# Patient Record
Sex: MALE | Race: OTHER | Employment: UNEMPLOYED | URBAN - METROPOLITAN AREA
[De-identification: names, ages, dates, MRNs, and addresses within clinical notes are randomized per-mention and may not be internally consistent; named-entity substitution may affect disease eponyms.]

---

## 2021-05-15 ENCOUNTER — HOSPITAL ENCOUNTER (EMERGENCY)
Age: 26
Discharge: OTHER INSTITUTION | DRG: 750 | End: 2021-05-15
Attending: EMERGENCY MEDICINE
Payer: COMMERCIAL

## 2021-05-15 ENCOUNTER — APPOINTMENT (OUTPATIENT)
Dept: CT IMAGING | Age: 26
DRG: 750 | End: 2021-05-15
Attending: EMERGENCY MEDICINE
Payer: COMMERCIAL

## 2021-05-15 VITALS
WEIGHT: 180 LBS | RESPIRATION RATE: 18 BRPM | BODY MASS INDEX: 25.77 KG/M2 | SYSTOLIC BLOOD PRESSURE: 150 MMHG | HEART RATE: 77 BPM | DIASTOLIC BLOOD PRESSURE: 94 MMHG | TEMPERATURE: 98.7 F | OXYGEN SATURATION: 97 % | HEIGHT: 70 IN

## 2021-05-15 LAB
ALBUMIN SERPL-MCNC: 4.1 G/DL (ref 3.5–5)
ALBUMIN/GLOB SERPL: 0.9 {RATIO} (ref 1.1–2.2)
ALP SERPL-CCNC: 115 U/L (ref 45–117)
ALT SERPL-CCNC: 100 U/L (ref 12–78)
AMPHET UR QL SCN: NEGATIVE
ANION GAP SERPL CALC-SCNC: 14 MMOL/L (ref 5–15)
APPEARANCE UR: CLEAR
AST SERPL W P-5'-P-CCNC: 99 U/L (ref 15–37)
BACTERIA URNS QL MICRO: NEGATIVE /HPF
BARBITURATES UR QL SCN: NEGATIVE
BASOPHILS # BLD: 0 K/UL (ref 0–0.1)
BASOPHILS NFR BLD: 0 % (ref 0–1)
BENZODIAZ UR QL: NEGATIVE
BILIRUB SERPL-MCNC: 0.4 MG/DL (ref 0.2–1)
BILIRUB UR QL: NEGATIVE
BUN SERPL-MCNC: 11 MG/DL (ref 6–20)
BUN/CREAT SERPL: 10 (ref 12–20)
CA-I BLD-MCNC: 9.2 MG/DL (ref 8.5–10.1)
CANNABINOIDS UR QL SCN: NEGATIVE
CHLORIDE SERPL-SCNC: 99 MMOL/L (ref 97–108)
CO2 SERPL-SCNC: 19 MMOL/L (ref 21–32)
COCAINE UR QL SCN: NEGATIVE
COLOR UR: ABNORMAL
CREAT SERPL-MCNC: 1.09 MG/DL (ref 0.7–1.3)
DIFFERENTIAL METHOD BLD: ABNORMAL
DRUG SCRN COMMENT,DRGCM: NORMAL
EOSINOPHIL # BLD: 0 K/UL (ref 0–0.4)
EOSINOPHIL NFR BLD: 0 % (ref 0–7)
ERYTHROCYTE [DISTWIDTH] IN BLOOD BY AUTOMATED COUNT: 14.2 % (ref 11.5–14.5)
ETHANOL SERPL-MCNC: <4 MG/DL
GLOBULIN SER CALC-MCNC: 4.4 G/DL (ref 2–4)
GLUCOSE SERPL-MCNC: 189 MG/DL (ref 65–100)
GLUCOSE UR STRIP.AUTO-MCNC: NEGATIVE MG/DL
HCT VFR BLD AUTO: 43.3 % (ref 36.6–50.3)
HGB BLD-MCNC: 14.1 G/DL (ref 12.1–17)
HGB UR QL STRIP: NEGATIVE
IMM GRANULOCYTES # BLD AUTO: 0.1 K/UL (ref 0–0.04)
IMM GRANULOCYTES NFR BLD AUTO: 1 % (ref 0–0.5)
KETONES UR QL STRIP.AUTO: 80 MG/DL
LEUKOCYTE ESTERASE UR QL STRIP.AUTO: NEGATIVE
LYMPHOCYTES # BLD: 1.1 K/UL (ref 0.8–3.5)
LYMPHOCYTES NFR BLD: 8 % (ref 12–49)
MCH RBC QN AUTO: 27.6 PG (ref 26–34)
MCHC RBC AUTO-ENTMCNC: 32.6 G/DL (ref 30–36.5)
MCV RBC AUTO: 84.9 FL (ref 80–99)
METHADONE UR QL: NEGATIVE
MONOCYTES # BLD: 0.6 K/UL (ref 0–1)
MONOCYTES NFR BLD: 4 % (ref 5–13)
MUCOUS THREADS URNS QL MICRO: ABNORMAL /LPF
NEUTS SEG # BLD: 12 K/UL (ref 1.8–8)
NEUTS SEG NFR BLD: 87 % (ref 32–75)
NITRITE UR QL STRIP.AUTO: NEGATIVE
NRBC # BLD: 0 K/UL (ref 0–0.01)
NRBC BLD-RTO: 0 PER 100 WBC
OPIATES UR QL: NEGATIVE
PCP UR QL: NEGATIVE
PH UR STRIP: 6 [PH] (ref 5–8)
PLATELET # BLD AUTO: 358 K/UL (ref 150–400)
PMV BLD AUTO: 9.3 FL (ref 8.9–12.9)
POTASSIUM SERPL-SCNC: 3.2 MMOL/L (ref 3.5–5.1)
PROT SERPL-MCNC: 8.5 G/DL (ref 6.4–8.2)
PROT UR STRIP-MCNC: 100 MG/DL
RBC # BLD AUTO: 5.1 M/UL (ref 4.1–5.7)
RBC #/AREA URNS HPF: ABNORMAL /HPF (ref 0–5)
SARS-COV-2, COV2: NORMAL
SARS-COV-2, COV2: NOT DETECTED
SODIUM SERPL-SCNC: 132 MMOL/L (ref 136–145)
SP GR UR REFRACTOMETRY: 1.02 (ref 1–1.03)
UA: UC IF INDICATED,UAUC: ABNORMAL
UROBILINOGEN UR QL STRIP.AUTO: 0.1 EU/DL (ref 0.1–1)
WBC # BLD AUTO: 13.9 K/UL (ref 4.1–11.1)
WBC URNS QL MICRO: ABNORMAL /HPF (ref 0–4)

## 2021-05-15 PROCEDURE — 85025 COMPLETE CBC W/AUTO DIFF WBC: CPT

## 2021-05-15 PROCEDURE — 93005 ELECTROCARDIOGRAM TRACING: CPT

## 2021-05-15 PROCEDURE — 90715 TDAP VACCINE 7 YRS/> IM: CPT | Performed by: EMERGENCY MEDICINE

## 2021-05-15 PROCEDURE — 70450 CT HEAD/BRAIN W/O DYE: CPT

## 2021-05-15 PROCEDURE — 90471 IMMUNIZATION ADMIN: CPT

## 2021-05-15 PROCEDURE — 99284 EMERGENCY DEPT VISIT MOD MDM: CPT

## 2021-05-15 PROCEDURE — 81001 URINALYSIS AUTO W/SCOPE: CPT

## 2021-05-15 PROCEDURE — 74011250636 HC RX REV CODE- 250/636: Performed by: EMERGENCY MEDICINE

## 2021-05-15 PROCEDURE — 82077 ASSAY SPEC XCP UR&BREATH IA: CPT

## 2021-05-15 PROCEDURE — 36415 COLL VENOUS BLD VENIPUNCTURE: CPT

## 2021-05-15 PROCEDURE — 87635 SARS-COV-2 COVID-19 AMP PRB: CPT

## 2021-05-15 PROCEDURE — 80053 COMPREHEN METABOLIC PANEL: CPT

## 2021-05-15 PROCEDURE — 80307 DRUG TEST PRSMV CHEM ANLYZR: CPT

## 2021-05-15 PROCEDURE — 96360 HYDRATION IV INFUSION INIT: CPT

## 2021-05-15 RX ADMIN — TETANUS TOXOID, REDUCED DIPHTHERIA TOXOID AND ACELLULAR PERTUSSIS VACCINE, ADSORBED 0.5 ML: 5; 2.5; 8; 8; 2.5 SUSPENSION INTRAMUSCULAR at 11:08

## 2021-05-15 RX ADMIN — SODIUM CHLORIDE 1000 ML: 9 INJECTION, SOLUTION INTRAVENOUS at 11:08

## 2021-05-15 NOTE — ED NOTES
Pt sitting up on stretcher. resps are even and unlabored. Skin warm and dry. No distress noted. Pt was asked what happened, and he stated \"[facility]. And I was sitting in a chair, and had a heart attack, and they left me there. And that's when the maggots and the snakes crawled into my body\"    Pt asked if he has mental health history and reports schizophrenia. Message left with [facility] for evaluation.

## 2021-05-15 NOTE — ED PROVIDER NOTES
EMERGENCY DEPARTMENT HISTORY AND PHYSICAL EXAM        Date: 5/15/2021  Patient Name: Jordi Waggoner    History of Presenting Illness     Chief Complaint   Patient presents with    Altered mental status    Drug Overdose       History Provided By: Patient    HPI: Jordi Waggoner, 26 y.o. male with history of psychiatric disorder who presents with episode of altered mental status and abrasions. Patient mitts to marijuana use. He is otherwise a poor historian due to psychiatric disorder. According to EMS patient was seen at group home to be having seizure-like activity. PCP: Yvonne Montiel NP        Past History     Past Medical History:  Psychiatric disorder    Past Surgical History:  Reviewed and noncontributory    Family History:  Reviewed and noncontributory    Social History:  Social History     Tobacco Use    Smoking status: Not on file   Substance Use Topics    Alcohol use: Not on file    Drug use: Not on file       Allergies: Allergies   Allergen Reactions    Haldol [Haloperidol Lactate] Anaphylaxis           Review of Systems   Review of Systems   Constitutional: Negative for fever. HENT: Negative for congestion. Eyes: Negative for visual disturbance. Respiratory: Negative for shortness of breath. Cardiovascular: Negative for chest pain. Gastrointestinal: Negative for abdominal pain. Genitourinary: Negative for dysuria. Musculoskeletal: Negative for arthralgias. Skin: Negative for rash. Neurological: Negative for headaches. Physical Exam   Constitutional: No acute distress. Well-nourished. Skin: No rash. Abrasions to forearm that are linear. ENT: No rhinorrhea. No cough. Head is normocephalic and atraumatic. Eye: No proptosis or conjunctival injections. Respiratory: No apparent respiratory distress. Gastrointestinal: Nondistended. Musculoskeletal: No obvious bony deformities. Psychiatric: Cooperative. Appropriate mood and affect.     Diagnostic Study Results Labs -     Recent Results (from the past 24 hour(s))   CBC WITH AUTOMATED DIFF    Collection Time: 05/15/21  9:14 AM   Result Value Ref Range    WBC 13.9 (H) 4.1 - 11.1 K/uL    RBC 5.10 4. 10 - 5.70 M/uL    HGB 14.1 12.1 - 17.0 g/dL    HCT 43.3 36.6 - 50.3 %    MCV 84.9 80.0 - 99.0 FL    MCH 27.6 26.0 - 34.0 PG    MCHC 32.6 30.0 - 36.5 g/dL    RDW 14.2 11.5 - 14.5 %    PLATELET 097 853 - 253 K/uL    MPV 9.3 8.9 - 12.9 FL    NRBC 0.0 0.0  WBC    ABSOLUTE NRBC 0.00 0.00 - 0.01 K/uL    NEUTROPHILS 87 (H) 32 - 75 %    LYMPHOCYTES 8 (L) 12 - 49 %    MONOCYTES 4 (L) 5 - 13 %    EOSINOPHILS 0 0 - 7 %    BASOPHILS 0 0 - 1 %    IMMATURE GRANULOCYTES 1 (H) 0 - 0.5 %    ABS. NEUTROPHILS 12.0 (H) 1.8 - 8.0 K/UL    ABS. LYMPHOCYTES 1.1 0.8 - 3.5 K/UL    ABS. MONOCYTES 0.6 0.0 - 1.0 K/UL    ABS. EOSINOPHILS 0.0 0.0 - 0.4 K/UL    ABS. BASOPHILS 0.0 0.0 - 0.1 K/UL    ABS. IMM. GRANS. 0.1 (H) 0.00 - 0.04 K/UL    DF AUTOMATED     METABOLIC PANEL, COMPREHENSIVE    Collection Time: 05/15/21  9:14 AM   Result Value Ref Range    Sodium 132 (L) 136 - 145 mmol/L    Potassium 3.2 (L) 3.5 - 5.1 mmol/L    Chloride 99 97 - 108 mmol/L    CO2 19 (L) 21 - 32 mmol/L    Anion gap 14 5 - 15 mmol/L    Glucose 189 (H) 65 - 100 mg/dL    BUN 11 6 - 20 mg/dL    Creatinine 1.09 0.70 - 1.30 mg/dL    BUN/Creatinine ratio 10 (L) 12 - 20      GFR est AA >60 >60 ml/min/1.73m2    GFR est non-AA >60 >60 ml/min/1.73m2    Calcium 9.2 8.5 - 10.1 mg/dL    Bilirubin, total 0.4 0.2 - 1.0 mg/dL    AST (SGOT) 99 (H) 15 - 37 U/L    ALT (SGPT) 100 (H) 12 - 78 U/L    Alk.  phosphatase 115 45 - 117 U/L    Protein, total 8.5 (H) 6.4 - 8.2 g/dL    Albumin 4.1 3.5 - 5.0 g/dL    Globulin 4.4 (H) 2.0 - 4.0 g/dL    A-G Ratio 0.9 (L) 1.1 - 2.2     ETHYL ALCOHOL    Collection Time: 05/15/21  9:14 AM   Result Value Ref Range    ALCOHOL(ETHYL),SERUM <4 <10 mg/dL   URINALYSIS W/ REFLEX CULTURE    Collection Time: 05/15/21  9:15 AM    Specimen: Urine   Result Value Ref Range Color Yellow/Straw      Appearance Clear Clear      Specific gravity 1.023 1.003 - 1.030      pH (UA) 6.0 5.0 - 8.0      Protein 100 (A) Negative mg/dL    Glucose Negative Negative mg/dL    Ketone 80 (A) Negative mg/dL    Bilirubin Negative Negative      Blood Negative Negative      Urobilinogen 0.1 0.1 - 1.0 EU/dL    Nitrites Negative Negative      Leukocyte Esterase Negative Negative      UA:UC IF INDICATED Culture not indicated by UA result Culture not indicated by UA result      WBC 0-4 0 - 4 /hpf    RBC 0-5 0 - 5 /hpf    Bacteria Negative Negative /hpf    Mucus Trace /lpf   DRUG SCREEN, URINE    Collection Time: 05/15/21  9:15 AM   Result Value Ref Range    AMPHETAMINES Negative Negative      BARBITURATES Negative Negative      BENZODIAZEPINES Negative Negative      COCAINE Negative Negative      METHADONE Negative Negative      OPIATES Negative Negative      PCP(PHENCYCLIDINE) Negative Negative      THC (TH-CANNABINOL) Negative Negative      Drug screen comment        This test is a screen for drugs of abuse in a medical setting only (i.e., they are unconfirmed results and as such must not be used for non-medical purposes, e.g.,employment testing, legal testing). Due to its inherent nature, false positive (FP) and false negative (FN) results may be obtained. Therefore, if necessary for medical care, recommend confirmation of positive findings by GC/MS. SARS-COV-2    Collection Time: 05/15/21  2:20 PM   Result Value Ref Range    SARS-CoV-2 Please find results under separate order         Radiologic Studies -   CT HEAD WO CONT   Final Result   No significant finding. If additional imaging is needed, consider   MRI. CT Results  (Last 48 hours)               05/15/21 0952  CT HEAD WO CONT Final result    Impression:  No significant finding. If additional imaging is needed, consider   MRI.        Narrative:  Dose Reduction:    All CT scans at this facility are performed using dose reduction optimization techniques as appropriate to a performed exam including the following: Automated   exposure control, adjustments of the mA and/or kV according to patient size, or   use of iterative reconstruction technique. Findings: Unenhanced images were obtained. Normal appearance of ventricles. Normal gray-white differentiation. No focal abnormalities seen involving brain   parenchyma. No evidence of brain mass, hemorrhage, or acute large vessel   distribution infarct. No abnormality is seen involving the corpus callosum,   craniovertebral junction, or pituitary gland. Imaged mastoids and paranasal   sinuses appear clear except for minimal anterior superior right maxillary   mucosal thickening. CXR Results  (Last 48 hours)    None          Medical Decision Making and ED Course     I reviewed the available vital signs, nursing notes, past medical history, past surgical history, family history, and social history. Vital Signs - Reviewed the patient's vital signs. Patient Vitals for the past 12 hrs:   Temp Pulse Resp BP SpO2   05/15/21 1110  (!) 110 22 (!) 159/90 100 %   05/15/21 0909 98.9 °F (37.2 °C) (!) 132 22 138/68 93 %         Medical Decision Making:   Presented with abnormal behavior and thoughts. The differential diagnosis is seizure, syncope, delusions, psychosis. Work-up is negative. I feel he is medically cleared. Will be seen by behavioral health. Likely needs psychiatric admission. Disposition     Care transitioned to Dr. Elvis Means. Will likely need psychiatric admission. Diagnosis     Clinical impression:   1. Self-inflicted injury           Attestation:  Please note that this dictation was completed with [employer], the computer voice recognition software. Quite often unanticipated grammatical, syntax, homophones, and other interpretive errors are inadvertently transcribed by the computer software. Please disregard these errors.  Please excuse any errors that have escaped final proofreading. Thank you.   Hilario De La Rosa, DO

## 2021-05-15 NOTE — ED NOTES
[address] [place] [address] home to inform pt being d/c'd. Report given via phone to Jessee Guerra [phone], Patient Care Coordinator at [facility] for pt to return. [place] sending ride to  pt w/ provided ETA 18:30. Pt updated on status & plan of care.
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
## 2021-05-16 LAB
ATRIAL RATE: 132 BPM
CALCULATED R AXIS, ECG10: 53 DEGREES
CALCULATED T AXIS, ECG11: 31 DEGREES
DIAGNOSIS, 93000: NORMAL
P-R INTERVAL, ECG05: 104 MS
Q-T INTERVAL, ECG07: 376 MS
QRS DURATION, ECG06: 92 MS
QTC CALCULATION (BEZET), ECG08: 557 MS
VENTRICULAR RATE, ECG03: 132 BPM

## 2021-05-17 ENCOUNTER — APPOINTMENT (OUTPATIENT)
Dept: CT IMAGING | Age: 26
DRG: 750 | End: 2021-05-17
Attending: NURSE PRACTITIONER
Payer: COMMERCIAL

## 2021-05-17 ENCOUNTER — APPOINTMENT (OUTPATIENT)
Dept: GENERAL RADIOLOGY | Age: 26
DRG: 750 | End: 2021-05-17
Attending: NURSE PRACTITIONER
Payer: COMMERCIAL

## 2021-05-17 ENCOUNTER — HOSPITAL ENCOUNTER (INPATIENT)
Age: 26
LOS: 22 days | Discharge: HOME | DRG: 750 | End: 2021-06-08
Attending: PSYCHIATRY & NEUROLOGY | Admitting: PSYCHIATRY & NEUROLOGY
Payer: COMMERCIAL

## 2021-05-17 DIAGNOSIS — N17.9 ACUTE KIDNEY INJURY (HCC): Primary | ICD-10-CM

## 2021-05-17 DIAGNOSIS — R45.851 SUICIDAL IDEATIONS: ICD-10-CM

## 2021-05-17 DIAGNOSIS — R44.0 AUDITORY HALLUCINATIONS: ICD-10-CM

## 2021-05-17 DIAGNOSIS — R41.0 DISORIENTATION: ICD-10-CM

## 2021-05-17 PROBLEM — F20.9 SCHIZOPHRENIA (HCC): Status: ACTIVE | Noted: 2021-05-17

## 2021-05-17 LAB
AMMONIA PLAS-SCNC: 18 UMOL/L
AMPHET UR QL SCN: NEGATIVE
ANION GAP SERPL CALC-SCNC: 11 MMOL/L (ref 5–15)
APAP SERPL-MCNC: <10 UG/ML (ref 10–30)
APPEARANCE UR: CLEAR
BACTERIA URNS QL MICRO: NEGATIVE /HPF
BARBITURATES UR QL SCN: NEGATIVE
BASOPHILS # BLD: 0 K/UL (ref 0–0.1)
BASOPHILS NFR BLD: 0 % (ref 0–1)
BENZODIAZ UR QL: NEGATIVE
BILIRUB UR QL: NEGATIVE
BUN SERPL-MCNC: 19 MG/DL (ref 6–20)
BUN/CREAT SERPL: 9 (ref 12–20)
CA-I BLD-MCNC: 8.4 MG/DL (ref 8.5–10.1)
CANNABINOIDS UR QL SCN: NEGATIVE
CHLORIDE SERPL-SCNC: 104 MMOL/L (ref 97–108)
CO2 SERPL-SCNC: 20 MMOL/L (ref 21–32)
COCAINE UR QL SCN: NEGATIVE
COLOR UR: NORMAL
CREAT SERPL-MCNC: 2.13 MG/DL (ref 0.7–1.3)
DATE LAST DOSE: ABNORMAL
DATE LAST DOSE: ABNORMAL
DIFFERENTIAL METHOD BLD: ABNORMAL
DRUG SCRN COMMENT,DRGCM: NORMAL
EOSINOPHIL # BLD: 0 K/UL (ref 0–0.4)
EOSINOPHIL NFR BLD: 0 % (ref 0–7)
ERYTHROCYTE [DISTWIDTH] IN BLOOD BY AUTOMATED COUNT: 13.9 % (ref 11.5–14.5)
ETHANOL SERPL-MCNC: <4 MG/DL
GLUCOSE SERPL-MCNC: 156 MG/DL (ref 65–100)
GLUCOSE UR STRIP.AUTO-MCNC: NEGATIVE MG/DL
HCT VFR BLD AUTO: 40.7 % (ref 36.6–50.3)
HGB BLD-MCNC: 13.6 G/DL (ref 12.1–17)
HGB UR QL STRIP: NEGATIVE
IMM GRANULOCYTES # BLD AUTO: 0.1 K/UL (ref 0–0.04)
IMM GRANULOCYTES NFR BLD AUTO: 1 % (ref 0–0.5)
KETONES UR QL STRIP.AUTO: NEGATIVE MG/DL
LACTATE SERPL-SCNC: 1.6 MMOL/L (ref 0.4–2)
LEUKOCYTE ESTERASE UR QL STRIP.AUTO: NEGATIVE
LYMPHOCYTES # BLD: 0.7 K/UL (ref 0.8–3.5)
LYMPHOCYTES NFR BLD: 6 % (ref 12–49)
MAGNESIUM SERPL-MCNC: 2.4 MG/DL (ref 1.6–2.4)
MCH RBC QN AUTO: 28 PG (ref 26–34)
MCHC RBC AUTO-ENTMCNC: 33.4 G/DL (ref 30–36.5)
MCV RBC AUTO: 83.9 FL (ref 80–99)
METHADONE UR QL: NEGATIVE
MONOCYTES # BLD: 0.8 K/UL (ref 0–1)
MONOCYTES NFR BLD: 6 % (ref 5–13)
MUCOUS THREADS URNS QL MICRO: NORMAL /LPF
NEUTS SEG # BLD: 10.9 K/UL (ref 1.8–8)
NEUTS SEG NFR BLD: 87 % (ref 32–75)
NITRITE UR QL STRIP.AUTO: NEGATIVE
NRBC # BLD: 0 K/UL (ref 0–0.01)
NRBC BLD-RTO: 0 PER 100 WBC
OPIATES UR QL: NEGATIVE
PCP UR QL: NEGATIVE
PH UR STRIP: 6 [PH] (ref 5–8)
PLATELET # BLD AUTO: 303 K/UL (ref 150–400)
PMV BLD AUTO: 9.6 FL (ref 8.9–12.9)
POTASSIUM SERPL-SCNC: 3.1 MMOL/L (ref 3.5–5.1)
PROT UR STRIP-MCNC: NEGATIVE MG/DL
RBC # BLD AUTO: 4.85 M/UL (ref 4.1–5.7)
RBC #/AREA URNS HPF: NORMAL /HPF (ref 0–5)
REPORTED DOSE,DOSE: ABNORMAL UNITS
REPORTED DOSE,DOSE: ABNORMAL UNITS
SALICYLATES SERPL-MCNC: <1.7 MG/DL (ref 2.8–20)
SARS-COV-2, COV2: NORMAL
SARS-COV-2, COV2: NOT DETECTED
SODIUM SERPL-SCNC: 135 MMOL/L (ref 136–145)
SP GR UR REFRACTOMETRY: <1.005 (ref 1–1.03)
TROPONIN I SERPL-MCNC: <0.05 NG/ML
UA: UC IF INDICATED,UAUC: NORMAL
UROBILINOGEN UR QL STRIP.AUTO: 0.1 EU/DL (ref 0.1–1)
WBC # BLD AUTO: 12.5 K/UL (ref 4.1–11.1)
WBC URNS QL MICRO: NORMAL /HPF (ref 0–4)

## 2021-05-17 PROCEDURE — 81001 URINALYSIS AUTO W/SCOPE: CPT

## 2021-05-17 PROCEDURE — 82077 ASSAY SPEC XCP UR&BREATH IA: CPT

## 2021-05-17 PROCEDURE — 65220000003 HC RM SEMIPRIVATE PSYCH

## 2021-05-17 PROCEDURE — 74011250637 HC RX REV CODE- 250/637: Performed by: PSYCHIATRY & NEUROLOGY

## 2021-05-17 PROCEDURE — 83735 ASSAY OF MAGNESIUM: CPT

## 2021-05-17 PROCEDURE — 74011250636 HC RX REV CODE- 250/636: Performed by: NURSE PRACTITIONER

## 2021-05-17 PROCEDURE — 71045 X-RAY EXAM CHEST 1 VIEW: CPT

## 2021-05-17 PROCEDURE — 80048 BASIC METABOLIC PNL TOTAL CA: CPT

## 2021-05-17 PROCEDURE — 85025 COMPLETE CBC W/AUTO DIFF WBC: CPT

## 2021-05-17 PROCEDURE — 70450 CT HEAD/BRAIN W/O DYE: CPT

## 2021-05-17 PROCEDURE — 99283 EMERGENCY DEPT VISIT LOW MDM: CPT

## 2021-05-17 PROCEDURE — 80143 DRUG ASSAY ACETAMINOPHEN: CPT

## 2021-05-17 PROCEDURE — 82140 ASSAY OF AMMONIA: CPT

## 2021-05-17 PROCEDURE — 83605 ASSAY OF LACTIC ACID: CPT

## 2021-05-17 PROCEDURE — 80307 DRUG TEST PRSMV CHEM ANLYZR: CPT

## 2021-05-17 PROCEDURE — 36415 COLL VENOUS BLD VENIPUNCTURE: CPT

## 2021-05-17 PROCEDURE — 84484 ASSAY OF TROPONIN QUANT: CPT

## 2021-05-17 PROCEDURE — 87635 SARS-COV-2 COVID-19 AMP PRB: CPT

## 2021-05-17 PROCEDURE — 80179 DRUG ASSAY SALICYLATE: CPT

## 2021-05-17 PROCEDURE — 99284 EMERGENCY DEPT VISIT MOD MDM: CPT

## 2021-05-17 RX ORDER — ACETAMINOPHEN 325 MG/1
650 TABLET ORAL
Status: DISCONTINUED | OUTPATIENT
Start: 2021-05-17 | End: 2021-06-08 | Stop reason: HOSPADM

## 2021-05-17 RX ORDER — METFORMIN HYDROCHLORIDE 500 MG/1
1000 TABLET ORAL
Status: DISCONTINUED | OUTPATIENT
Start: 2021-05-17 | End: 2021-05-18

## 2021-05-17 RX ORDER — PANTOPRAZOLE SODIUM 40 MG/1
40 TABLET, DELAYED RELEASE ORAL
Status: DISCONTINUED | OUTPATIENT
Start: 2021-05-18 | End: 2021-06-08 | Stop reason: HOSPADM

## 2021-05-17 RX ORDER — TRAZODONE HYDROCHLORIDE 50 MG/1
50 TABLET ORAL
Status: DISCONTINUED | OUTPATIENT
Start: 2021-05-17 | End: 2021-06-08 | Stop reason: HOSPADM

## 2021-05-17 RX ORDER — GUAIFENESIN 100 MG/5ML
81 LIQUID (ML) ORAL DAILY
Status: DISCONTINUED | OUTPATIENT
Start: 2021-05-18 | End: 2021-06-08 | Stop reason: HOSPADM

## 2021-05-17 RX ORDER — LORAZEPAM 2 MG/ML
1 INJECTION INTRAMUSCULAR
Status: DISCONTINUED | OUTPATIENT
Start: 2021-05-17 | End: 2021-06-08 | Stop reason: HOSPADM

## 2021-05-17 RX ORDER — FLUTICASONE PROPIONATE 50 MCG
1 SPRAY, SUSPENSION (ML) NASAL DAILY
Status: DISCONTINUED | OUTPATIENT
Start: 2021-05-18 | End: 2021-06-08 | Stop reason: HOSPADM

## 2021-05-17 RX ORDER — ADHESIVE BANDAGE
30 BANDAGE TOPICAL DAILY PRN
Status: DISCONTINUED | OUTPATIENT
Start: 2021-05-17 | End: 2021-06-08 | Stop reason: HOSPADM

## 2021-05-17 RX ORDER — LORATADINE 10 MG/1
10 TABLET ORAL DAILY
Status: DISCONTINUED | OUTPATIENT
Start: 2021-05-18 | End: 2021-06-08 | Stop reason: HOSPADM

## 2021-05-17 RX ORDER — DIPHENHYDRAMINE HYDROCHLORIDE 50 MG/ML
50 INJECTION, SOLUTION INTRAMUSCULAR; INTRAVENOUS
Status: DISCONTINUED | OUTPATIENT
Start: 2021-05-17 | End: 2021-06-08 | Stop reason: HOSPADM

## 2021-05-17 RX ORDER — SERTRALINE HYDROCHLORIDE 50 MG/1
50 TABLET, FILM COATED ORAL DAILY
Status: DISCONTINUED | OUTPATIENT
Start: 2021-05-18 | End: 2021-06-08 | Stop reason: HOSPADM

## 2021-05-17 RX ORDER — LANOLIN ALCOHOL/MO/W.PET/CERES
3 CREAM (GRAM) TOPICAL
Status: DISCONTINUED | OUTPATIENT
Start: 2021-05-17 | End: 2021-06-08 | Stop reason: HOSPADM

## 2021-05-17 RX ORDER — IBUPROFEN 200 MG
1 TABLET ORAL DAILY
Status: DISCONTINUED | OUTPATIENT
Start: 2021-05-18 | End: 2021-06-08 | Stop reason: HOSPADM

## 2021-05-17 RX ORDER — HYDROXYZINE 50 MG/1
50 TABLET, FILM COATED ORAL
Status: DISCONTINUED | OUTPATIENT
Start: 2021-05-17 | End: 2021-06-08 | Stop reason: HOSPADM

## 2021-05-17 RX ORDER — FLUTICASONE PROPIONATE 110 UG/1
1 AEROSOL, METERED RESPIRATORY (INHALATION)
Status: DISCONTINUED | OUTPATIENT
Start: 2021-05-17 | End: 2021-06-08 | Stop reason: HOSPADM

## 2021-05-17 RX ORDER — HYDROXYZINE PAMOATE 25 MG/1
25 CAPSULE ORAL 2 TIMES DAILY
Status: DISCONTINUED | OUTPATIENT
Start: 2021-05-17 | End: 2021-05-27

## 2021-05-17 RX ORDER — ALBUTEROL SULFATE 90 UG/1
2 AEROSOL, METERED RESPIRATORY (INHALATION)
Status: DISCONTINUED | OUTPATIENT
Start: 2021-05-17 | End: 2021-06-08 | Stop reason: HOSPADM

## 2021-05-17 RX ORDER — CLOZAPINE 100 MG/1
200 TABLET ORAL
Status: DISCONTINUED | OUTPATIENT
Start: 2021-05-17 | End: 2021-06-08 | Stop reason: HOSPADM

## 2021-05-17 RX ORDER — POLYETHYLENE GLYCOL 3350 17 G/17G
17 POWDER, FOR SOLUTION ORAL
Status: DISCONTINUED | OUTPATIENT
Start: 2021-05-17 | End: 2021-06-08 | Stop reason: HOSPADM

## 2021-05-17 RX ORDER — DOCUSATE SODIUM 100 MG/1
100 CAPSULE, LIQUID FILLED ORAL DAILY
Status: DISCONTINUED | OUTPATIENT
Start: 2021-05-18 | End: 2021-06-08 | Stop reason: HOSPADM

## 2021-05-17 RX ADMIN — SODIUM CHLORIDE 1000 ML: 9 INJECTION, SOLUTION INTRAVENOUS at 15:55

## 2021-05-17 RX ADMIN — SODIUM CHLORIDE 1000 ML: 9 INJECTION, SOLUTION INTRAVENOUS at 09:23

## 2021-05-17 RX ADMIN — HYDROXYZINE PAMOATE 25 MG: 25 CAPSULE ORAL at 23:00

## 2021-05-17 NOTE — ED TRIAGE NOTES
Pt reports thirst after ingestion of marajuana edible. Pt comes form [facility].

## 2021-05-17 NOTE — ED PROVIDER NOTES
EMERGENCY DEPARTMENT HISTORY AND PHYSICAL EXAM      Date: 5/17/2021  Patient Name: Nazanin Millan      History of Presenting Illness     Chief Complaint   Patient presents with    Physical       History Provided By: Patient and neightbor    HPI: Nazanin Millan, 26 y.o. male with a past medical history significant Psychiatric disorder presents to the ED transfer via EMS from [facility] and housing with cc of lethargic, AMS, marijuana use per EMS. Patient reports having nightmares feeling shaky knocking on neighbors door who called 911. Patient denies any smoking, drinking, drug use however is lethargic on assessment. old Abrasions noted to head and wrist.  While Performing exam patient talking to self reports having auditory hallucinations telling him to hurt himself. lateral abrasions is noted on bilateral wrist.  Patient is poor historian at this time. There are no other complaints, changes, or physical findings at this time. PCP: Aditya Story NP    Current Facility-Administered Medications   Medication Dose Route Frequency Provider Last Rate Last Admin    hydrOXYzine HCL (ATARAX) tablet 50 mg  50 mg Oral TID PRN Wali Rocha MD        LORazepam (ATIVAN) injection 1 mg  1 mg IntraMUSCular Q4H PRN Wali Rocha MD        traZODone (DESYREL) tablet 50 mg  50 mg Oral QHS PRN Easton Browning MD        acetaminophen (TYLENOL) tablet 650 mg  650 mg Oral Q4H PRN Easton Browning MD        magnesium hydroxide (MILK OF MAGNESIA) 400 mg/5 mL oral suspension 30 mL  30 mL Oral DAILY PRN Easton Browning MD        diphenhydrAMINE (BENADRYL) injection 50 mg  50 mg IntraMUSCular BID PRN Easton Browning MD        ziprasidone (GEODON) 20 mg in sterile water (preservative free) 1 mL injection  20 mg IntraMUSCular Q12H PRN Easton Browning MD           Past History     Past Medical History:  No past medical history on file. Past Surgical History:  No past surgical history on file.     Family History:  No family history on file. Social History:  Social History     Tobacco Use    Smoking status: Not on file   Substance Use Topics    Alcohol use: Not on file    Drug use: Not on file       Allergies: Allergies   Allergen Reactions    Haldol [Haloperidol Lactate] Anaphylaxis         Review of Systems     Review of Systems   Constitutional: Positive for chills. Negative for fever. HENT: Negative for congestion, sinus pressure and sinus pain. Respiratory: Positive for shortness of breath. Negative for cough. Cardiovascular: Positive for chest pain. Negative for leg swelling. Gastrointestinal: Negative for abdominal pain, nausea and vomiting. Genitourinary: Negative for dysuria, frequency and urgency. Musculoskeletal: Negative for arthralgias and myalgias. Skin: Negative. Neurological: Positive for dizziness. Negative for weakness, light-headedness, numbness and headaches. Psychiatric/Behavioral: Positive for hallucinations and suicidal ideas. Physical Exam     Physical Exam  Vitals and nursing note reviewed. Constitutional:       General: He is not in acute distress. Appearance: Normal appearance. He is normal weight. He is not ill-appearing or toxic-appearing. HENT:      Head: Normocephalic and atraumatic. Right Ear: Hearing normal.      Left Ear: Hearing normal.      Nose: Nose normal.      Mouth/Throat:      Mouth: Mucous membranes are moist.   Eyes:      General: Lids are normal.      Extraocular Movements: Extraocular movements intact. Pupils: Pupils are equal, round, and reactive to light. Cardiovascular:      Rate and Rhythm: Normal rate and regular rhythm. Pulses: Normal pulses. Radial pulses are 2+ on the right side and 2+ on the left side. Dorsalis pedis pulses are 2+ on the right side and 2+ on the left side. Pulmonary:      Effort: Pulmonary effort is normal. No accessory muscle usage or respiratory distress.       Breath sounds: Normal breath sounds. No wheezing or rhonchi. Abdominal:      General: Bowel sounds are normal.      Palpations: Abdomen is soft. Tenderness: There is no abdominal tenderness. There is no right CVA tenderness or left CVA tenderness. Musculoskeletal:      Cervical back: Normal range of motion and neck supple. No muscular tenderness. Right lower leg: No edema. Left lower leg: No edema. Feet:      Right foot:      Skin integrity: No skin breakdown. Left foot:      Skin integrity: No skin breakdown. Skin:     General: Skin is warm and dry. Capillary Refill: Capillary refill takes less than 2 seconds. Findings: Abrasion present. No bruising, ecchymosis, erythema or signs of injury. Comments: Old Abrasion to face and bilateral wrist   Neurological:      Mental Status: He is oriented to person, place, and time. He is lethargic. GCS: GCS eye subscore is 4. GCS verbal subscore is 5. GCS motor subscore is 6. Cranial Nerves: Cranial nerves are intact. Sensory: Sensation is intact. Psychiatric:         Attention and Perception: Attention normal.         Mood and Affect: Affect is flat. Speech: Speech is delayed. Behavior: Behavior is slowed. Behavior is cooperative. Thought Content: Thought content includes suicidal ideation. Thought content does not include suicidal plan. Cognition and Memory: Cognition is impaired.          Lab and Diagnostic Study Results     Labs -     Recent Results (from the past 12 hour(s))   CBC WITH AUTOMATED DIFF    Collection Time: 05/17/21  9:28 AM   Result Value Ref Range    WBC 12.5 (H) 4.1 - 11.1 K/uL    RBC 4.85 4.10 - 5.70 M/uL    HGB 13.6 12.1 - 17.0 g/dL    HCT 40.7 36.6 - 50.3 %    MCV 83.9 80.0 - 99.0 FL    MCH 28.0 26.0 - 34.0 PG    MCHC 33.4 30.0 - 36.5 g/dL    RDW 13.9 11.5 - 14.5 %    PLATELET 264 976 - 098 K/uL    MPV 9.6 8.9 - 12.9 FL    NRBC 0.0 0.0  WBC    ABSOLUTE NRBC 0.00 0.00 - 0.01 K/uL    NEUTROPHILS 87 (H) 32 - 75 %    LYMPHOCYTES 6 (L) 12 - 49 %    MONOCYTES 6 5 - 13 %    EOSINOPHILS 0 0 - 7 %    BASOPHILS 0 0 - 1 %    IMMATURE GRANULOCYTES 1 (H) 0 - 0.5 %    ABS. NEUTROPHILS 10.9 (H) 1.8 - 8.0 K/UL    ABS. LYMPHOCYTES 0.7 (L) 0.8 - 3.5 K/UL    ABS. MONOCYTES 0.8 0.0 - 1.0 K/UL    ABS. EOSINOPHILS 0.0 0.0 - 0.4 K/UL    ABS. BASOPHILS 0.0 0.0 - 0.1 K/UL    ABS. IMM.  GRANS. 0.1 (H) 0.00 - 0.04 K/UL    DF AUTOMATED     METABOLIC PANEL, BASIC    Collection Time: 05/17/21  9:28 AM   Result Value Ref Range    Sodium 135 (L) 136 - 145 mmol/L    Potassium 3.1 (L) 3.5 - 5.1 mmol/L    Chloride 104 97 - 108 mmol/L    CO2 20 (L) 21 - 32 mmol/L    Anion gap 11 5 - 15 mmol/L    Glucose 156 (H) 65 - 100 mg/dL    BUN 19 6 - 20 mg/dL    Creatinine 2.13 (H) 0.70 - 1.30 mg/dL    BUN/Creatinine ratio 9 (L) 12 - 20      GFR est AA 46 (L) >60 ml/min/1.73m2    GFR est non-AA 38 (L) >60 ml/min/1.73m2    Calcium 8.4 (L) 8.5 - 10.1 mg/dL   ETHYL ALCOHOL    Collection Time: 05/17/21  9:28 AM   Result Value Ref Range    ALCOHOL(ETHYL),SERUM <4 <10 mg/dL   LACTIC ACID    Collection Time: 05/17/21 10:35 AM   Result Value Ref Range    Lactic acid 1.6 0.4 - 2.0 mmol/L   SALICYLATE    Collection Time: 05/17/21 10:35 AM   Result Value Ref Range    Salicylate level <9.2 (L) 2.8 - 20.0 mg/dL    Reported dose date Not provided      Reported dose: Not provided Units   ACETAMINOPHEN    Collection Time: 05/17/21 10:35 AM   Result Value Ref Range    Acetaminophen level <10 (L) 10 - 30 ug/mL    Reported dose date Not provided      Reported dose: Not provided Units   MAGNESIUM    Collection Time: 05/17/21 10:35 AM   Result Value Ref Range    Magnesium 2.4 1.6 - 2.4 mg/dL   TROPONIN I    Collection Time: 05/17/21 10:35 AM   Result Value Ref Range    Troponin-I, Qt. <0.05 <0.05 ng/mL   DRUG SCREEN, URINE    Collection Time: 05/17/21 11:00 AM   Result Value Ref Range    AMPHETAMINES Negative Negative      BARBITURATES Negative Negative      BENZODIAZEPINES Negative Negative      COCAINE Negative Negative      METHADONE Negative Negative      OPIATES Negative Negative      PCP(PHENCYCLIDINE) Negative Negative      THC (TH-CANNABINOL) Negative Negative      Drug screen comment        This test is a screen for drugs of abuse in a medical setting only (i.e., they are unconfirmed results and as such must not be used for non-medical purposes, e.g.,employment testing, legal testing). Due to its inherent nature, false positive (FP) and false negative (FN) results may be obtained. Therefore, if necessary for medical care, recommend confirmation of positive findings by GC/MS. URINALYSIS W/ REFLEX CULTURE    Collection Time: 05/17/21 11:00 AM    Specimen: Urine   Result Value Ref Range    Color Yellow/Straw      Appearance Clear Clear      Specific gravity <1.005 1.003 - 1.030    pH (UA) 6.0 5.0 - 8.0      Protein Negative Negative mg/dL    Glucose Negative Negative mg/dL    Ketone Negative Negative mg/dL    Bilirubin Negative Negative      Blood Negative Negative      Urobilinogen 0.1 0.1 - 1.0 EU/dL    Nitrites Negative Negative      Leukocyte Esterase Negative Negative      UA:UC IF INDICATED Culture not indicated by UA result Culture not indicated by UA result      WBC 0-4 0 - 4 /hpf    RBC 0-5 0 - 5 /hpf    Bacteria Negative Negative /hpf    Mucus Trace /lpf   SARS-COV-2    Collection Time: 05/17/21 12:00 PM   Result Value Ref Range    SARS-CoV-2 Please find results under separate order     SARS-COV-2    Collection Time: 05/17/21 12:00 PM   Result Value Ref Range    SARS-CoV-2 Not Detected Not Detected     AMMONIA    Collection Time: 05/17/21  3:00 PM   Result Value Ref Range    Ammonia 18 <32 umol/L       Radiologic Studies -   [unfilled]  CT Results  (Last 48 hours)               05/17/21 1059  CT HEAD WO CONT Final result    Impression:  No acute intracranial process. Other findings as above.                Narrative:  CT head, 5/17/2021       History: Altered mental status. Seizure. Technique: No intravenous contrast was administered. Multiple contiguous axial   images were acquired from the vertex to the skull base. Coronal and sagittal   reconstruction was made. All CT scans at this facility are performed using dose reduction optimization   techniques as appropriate to perform the exam including the following: Automated   exposure control, adjustments of the mA and/or kV according to patient size, or   use iterative reconstruction technique. Comparison: None. Findings: Image quality is graded by motion. Cortical volume and ventricular system size are within normal limits. Gray-white differentiation is preserved. No acute intracranial hemorrhage or   midline shift is identified. The calvarium is intact. The orbits and globes are intact. Small mucous retention cyst is seen in the   right maxillary sinus. There is no specific CT evidence of acute sinusitis. The remainder of the visualized paranasal sinuses and mastoid air cells are   clear. CXR Results  (Last 48 hours)               05/17/21 1111  XR CHEST PORT Final result    Impression:  No acute pulmonary process. Narrative:  Chest, frontal view, 5/17/2021       History: Sepsis. Comparison: None. Findings: The cardiac silhouette is within normal limits. The lungs are under   expanded. No hydrostatic edema is present. No focal consolidation, pleural   effusions or pneumothorax is identified. No acute osseous findings are   definitively seen. Medical Decision Making and ED Course   - I am the first and primary provider for this patient AND AM THE PRIMARY PROVIDER OF RECORD. - I reviewed the vital signs, available nursing notes, past medical history, past surgical history, family history and social history. - Initial assessment performed.  The patients presenting problems have been discussed, and the staff are in agreement with the care plan formulated and outlined with them. I have encouraged them to ask questions as they arise throughout their visit. Vital Signs-Reviewed the patient's vital signs. Patient Vitals for the past 12 hrs:   Temp Pulse Resp BP SpO2   05/17/21 0927 98.6 °F (37 °C) 88 20 (!) 147/94 100 %         Records Reviewed: Old Medical Records    The patient presents with AMS with a differential diagnosis of psychiatric disorder, drug overdose, sepsis, dehydration, suicidal ideations    ED Course:       ED Course as of May 17 1903   Mon May 17, 2021   1704 Telephone call to patient's neighbor Carolina Rodgers [phone] who reports Jose Iraheta was knocking on his door when he opened the door he was down on his hands and knees attempted to help patient stand unsuccessful patient fell back. To have difficulty breathing called 911 who advised him to come to the emergency room for further evaluation. Per neighbor is not aware patient doing any drugs prior to arrival    [AM]   1 [address] [place] [address] notified.     [AM]   [id number] Spoke to Dr. Kelly Waggoner who reports pt does not require medical admit and for [address] [place] [address] to consult.     [AM]      ED Course User Index  [AM] Cain North NP         Provider Notes (Medical Decision Making):   Patient seems disoriented on initial exam appearing to perk up as time continues noted to have acute kidney injury IV hydration given labs grossly negative UDS negative head CT chest x-ray within normal limits urine UA negative for UTI. Unsure of signs of altered mental status are related to behavioral health however pt had improvement in status. Pt cleared and will be admitted to [address] [place] [address] for further evaluation    Consultations:       Consultations: [address] [place] [address] and hospitalist.        Procedures and Metsa 21, NP        Disposition     Disposition: admit to [facility]:  1. There are no discharge medications for this patient.     2. Follow-up Information    None       3. Return to ED if worse   4. There are no discharge medications for this patient. Diagnosis     Clinical Impression:   1. Acute kidney injury ([address]    2. Disorientation    3. Auditory hallucinations    4. Suicidal ideations        Attestations:    Red Villarreal NP    Please note that this dictation was completed with [employer], the computer voice recognition software. Quite often unanticipated grammatical, syntax, homophones, and other interpretive errors are inadvertently transcribed by the computer software. Please disregard these errors. Please excuse any errors that have escaped final proofreading. Thank you.

## 2021-05-17 NOTE — BSMART NOTE
[address] [place] INTAKE ASSESSMENT Pt assessed face to face in ED 20. Pt laying on str in green gown calm whispering to no one in the room and laughing and smiling innappr. Pt is clearly responding to internal stimuli. ED staff states that pt has been in the room carrying on a conversation and no one is in the room. Pt is also slow to respond to questions and has to be redirected to questions asked. Pt presented to ED unknown pt go to ED, with c/o AMS with poss ingestion of weed. Pt states he came to ED as he \"felt that he needed to be hydrated. \"Pt denies ingestion of weed. Pt states he is having AH with voices telling him to hurt himself. Pt denies SI HI AV. Pt does not want to stay in the hosp. Pt denies access to weapons and substance abuse. Pt gives h/o Schizophrenia and past Hosp includes [address] [facility]. Pt denies psychiatrist family mental illness legal issues PMH or trauma h/o. Pt states he lives in [facility], [facility] assisted living and his support is the staff. Allergies include Haldol. Pt does not know what medications that he takes. Dr. Kevan Iqbal called and states pt meets criteria for IP [address] [place] treatment and to call D19 to assess  Pt. Awaiting for pt to be medically cleared. Pt does not want to stay in the hosp and D19 called to assess pt. Nancy states to fax the medicals and she will call back when someone is avail for assessment. 1500 Pt assessed face to face by Nina, D19 via Zoom. 1700 Pt to be TDO and Dr. Kevan Iqbal to accept after medically cleared and negative Covid Test.  Awaiting TDO to be served  Huber Oscar RN ED notified

## 2021-05-18 LAB
GLUCOSE BLD STRIP.AUTO-MCNC: 119 MG/DL (ref 65–117)
PERFORMED BY, TECHID: ABNORMAL

## 2021-05-18 PROCEDURE — 65220000003 HC RM SEMIPRIVATE PSYCH

## 2021-05-18 PROCEDURE — 74011250637 HC RX REV CODE- 250/637: Performed by: PSYCHIATRY & NEUROLOGY

## 2021-05-18 PROCEDURE — 74011250637 HC RX REV CODE- 250/637: Performed by: FAMILY MEDICINE

## 2021-05-18 PROCEDURE — 74011250637 HC RX REV CODE- 250/637: Performed by: NURSE PRACTITIONER

## 2021-05-18 PROCEDURE — 82962 GLUCOSE BLOOD TEST: CPT

## 2021-05-18 RX ORDER — OLANZAPINE 5 MG/1
10 TABLET, ORALLY DISINTEGRATING ORAL 2 TIMES DAILY
Status: DISCONTINUED | OUTPATIENT
Start: 2021-05-18 | End: 2021-06-08 | Stop reason: HOSPADM

## 2021-05-18 RX ORDER — POTASSIUM CHLORIDE 750 MG/1
40 TABLET, FILM COATED, EXTENDED RELEASE ORAL
Status: COMPLETED | OUTPATIENT
Start: 2021-05-18 | End: 2021-05-18

## 2021-05-18 RX ORDER — POTASSIUM CHLORIDE 1.5 G/1.77G
40 POWDER, FOR SOLUTION ORAL
Status: COMPLETED | OUTPATIENT
Start: 2021-05-18 | End: 2021-05-18

## 2021-05-18 RX ADMIN — PANTOPRAZOLE SODIUM 40 MG: 40 TABLET, DELAYED RELEASE ORAL at 10:43

## 2021-05-18 RX ADMIN — LORATADINE 10 MG: 10 TABLET ORAL at 09:57

## 2021-05-18 RX ADMIN — HYDROXYZINE PAMOATE 25 MG: 25 CAPSULE ORAL at 09:57

## 2021-05-18 RX ADMIN — ASPIRIN 81 MG: 81 TABLET, CHEWABLE ORAL at 09:56

## 2021-05-18 RX ADMIN — TRAZODONE HYDROCHLORIDE 50 MG: 50 TABLET ORAL at 01:45

## 2021-05-18 RX ADMIN — HYDROXYZINE HYDROCHLORIDE 50 MG: 50 TABLET, FILM COATED ORAL at 19:39

## 2021-05-18 RX ADMIN — POTASSIUM CHLORIDE 40 MEQ: 750 TABLET, EXTENDED RELEASE ORAL at 13:05

## 2021-05-18 RX ADMIN — DOCUSATE SODIUM 100 MG: 100 CAPSULE, LIQUID FILLED ORAL at 09:56

## 2021-05-18 RX ADMIN — Medication 1 CAPSULE: at 09:56

## 2021-05-18 RX ADMIN — METFORMIN HYDROCHLORIDE 1000 MG: 500 TABLET ORAL at 01:45

## 2021-05-18 RX ADMIN — POTASSIUM CHLORIDE 40 MEQ: 1.5 FOR SOLUTION ORAL at 19:39

## 2021-05-18 RX ADMIN — CLOZAPINE 200 MG: 100 TABLET ORAL at 21:54

## 2021-05-18 RX ADMIN — OLANZAPINE 10 MG: 5 TABLET, ORALLY DISINTEGRATING ORAL at 13:05

## 2021-05-18 RX ADMIN — FLUOXETINE 30 MG: 20 CAPSULE ORAL at 09:57

## 2021-05-18 RX ADMIN — MELATONIN TAB 3 MG 3 MG: 3 TAB at 01:45

## 2021-05-18 RX ADMIN — MELATONIN TAB 3 MG 3 MG: 3 TAB at 21:54

## 2021-05-18 RX ADMIN — HYDROXYZINE PAMOATE 25 MG: 25 CAPSULE ORAL at 21:55

## 2021-05-18 RX ADMIN — OLANZAPINE 10 MG: 5 TABLET, ORALLY DISINTEGRATING ORAL at 21:54

## 2021-05-18 RX ADMIN — SERTRALINE HYDROCHLORIDE 50 MG: 50 TABLET ORAL at 09:56

## 2021-05-18 NOTE — BH NOTES
Patient Unable to Complete Assessment Not Appropriate for Assessment at this time. Pt was having a conversation to himself in the bathroom.  Will attempt to complete RT assessment at a later time

## 2021-05-18 NOTE — ED NOTES
Pt in green gown, belongings collected and stored in station 3 closet with pt label on bags (1 belonging bags), contents checked and verified no weapons.     SCOT Medrano RN

## 2021-05-18 NOTE — BH NOTES
Patient Unable to Complete Assessment Refused Assessment Pt stated he was not feeling well and to \"come back later\". Will attempt to complete RT assessment at a later time.

## 2021-05-18 NOTE — BH NOTES
Patient sitting at the foot of the bed talking loudly to himself. This am. He was up in the day room to eat all of his meals. No interaction noted with others. He stares into space blankly while eating,& he smiles to himself. He has been yelling out loud & crying frequently. He had been standing & looking out of the window talking & laughing. He requested some underwear c/o his being dirty but declined to put on the disp. pair given to him. He goes to the bathroom & removes his gown & stands & cough & c/o having some nausea . He was has accepted his medication he declined nasal spray, the inhaler & nicotine patch. Pt has remains safe, He has been absence of falls. He remains on close observation.

## 2021-05-18 NOTE — CONSULTS
MARGARET Valdes-C    History and Physical      Patient: Rachana Darden MRN: [id number]  SSN: [id number]    YOB: 1995  Age: 26 y.o. Sex: male        Chief Complaint   Patient presents with    Physical       Subjective:      Rachana Darden is a 26 y.o. male who presents  to the ED transfer via EMS from [facility] and housing with cc of lethargic, AMS, marijuana use per EMS. Patient reported having nightmares feeling shaky knocking on neighbors door who called 911. Patient denies any smoking, drinking, drug use however was lethargic on arrival. Reports having auditory hallucinations telling him to hurt himself lateral abrasions is noted on bilateral wrist.  Patient is poor historian at this time; however, past medical history significant Psychiatric disorder which obtained from previous records. Patient seen on behavioral on health unit in room rapping, flight of ideas, poor historian. He does make eye contact. Facial abrasions noted. Past Medical History:   Diagnosis Date    [facility] ([facility] [address]     Psychotic disorder ([facility] [address] [facility]      History reviewed. No pertinent surgical history. History reviewed. No pertinent family history. Social History     Tobacco Use    Smoking status: Current Some Day Smoker   Substance Use Topics    Alcohol use: Not on file      Prior to Admission medications    Not on File        Allergies   Allergen Reactions    Haldol [Haloperidol Lactate] Anaphylaxis       Review of Systems:  Review of Systems   Constitutional: Negative. HENT: Negative. Eyes: Negative. Respiratory: Negative. Cardiovascular: Negative. Gastrointestinal: Negative. Genitourinary: Negative. Musculoskeletal: Negative. Skin: Negative. Endo/Heme/Allergies: Negative. Psychiatric/Behavioral: Negative.          Objective:     Vitals:    05/17/21 0927 05/17/21 2146 05/18/21 0814   BP: (!) 147/94 (!) 140/91 (!) 146/101   Pulse: 88 76 82   Resp: 20 18 17   Temp: 98.6 °F (37 °C) 98.8 °F (37.1 °C) 97.5 °F (36.4 °C)   SpO2: 100% 98% 100%   Weight: 95.3 kg (210 lb) 95.3 kg (210 lb 1.6 oz)    Height: 5' 10\" (1.778 m) 5' 10\" (1.778 m)         Physical Exam:  Physical Exam  Vitals signs and nursing note reviewed. Constitutional:       Appearance: He is obese. HENT:      Nose: Nose normal.      Mouth/Throat:      Mouth: Mucous membranes are moist.   Eyes:      Extraocular Movements: Extraocular movements intact. Cardiovascular:      Rate and Rhythm: Normal rate and regular rhythm. Pulses: Normal pulses. Heart sounds: Normal heart sounds. Pulmonary:      Effort: Pulmonary effort is normal.      Breath sounds: Normal breath sounds. Abdominal:      General: Bowel sounds are normal.      Palpations: Abdomen is soft. Musculoskeletal: Normal range of motion. Skin:     General: Skin is warm. Capillary Refill: Capillary refill takes less than 2 seconds. Comments: Facial and wrists abrasions   Neurological:      Mental Status: He is alert. He is disoriented. Psychiatric:      Comments: Disoriented, responding to internal stimuli          Assessment:     1. Acute kidney injury  2. Hypokalemia   3. Auditory hallucinations  4. Suicidal ideations  5. Tobacco dependency   6. GERD    Plan:   1. Encourage oral hydration. Repeat bmp in am  2. Replenish with oral K 40 meq   3. Defer psychiatric problems to behavioral health team for management  4. Currently clozapine, vistaril, zyprexa, zoloft  5.  on smoking cessation, nicotine patch  6. Continue protonix           Full Code  GI PROPHYLAXIS protonix  DVT PROPHYLAXIS ambulatory  Home medications reviewed and reconciled    Above treatment plan reviewed and discussed with patient in detail at bedside, all questions answered. Thank you for the consult, medical team will continue to follow.  Please do not hesitate to call with questions or sherri    Signed By: Lizette Albrecht NP     May 18, 2021

## 2021-05-18 NOTE — BH NOTES
PSA ATTEMPT     Writer attempted to complete this pts PSA. Pt seen in his room speaking to himself disheveled ans signing. Pt unable to respond to writers prompts and unable to complete assessment. Pt signed treatment plan at [phone] on 5/18/21.

## 2021-05-18 NOTE — GROUP NOTE
[facility] GROUP DOCUMENTATION INDIVIDUAL Group Therapy Note Date: 5/18/2021 Group Start Time: 1100 Group End Time: 1200 Group Topic: Psychological Group [facility] Minerva HAMILTON 
 
[facility] GROUP DOCUMENTATION GROUP Group Therapy Note Attendees: 6/13 Patients discussed strengths worksheet, identified current strengths, goals, ,and how they can utilize  their strengths to achieve their goals. Patients encouraged to complete worksheet then share back with the group. Patients encouraged to discuss openly and honestly, give supportive feedback to their peers. Attendance: Did not attend Patient's Goal:  n/a Interventions/techniques: n/a Follows Directions: n/a Interactions: n/a Mental Status: n/a Behavior/appearance: n/a 
 
Goals Achieved: n/a Additional Notes:  Pt encouraged but did not attend group. Shruthi Kapadia

## 2021-05-18 NOTE — BH NOTES
Behavioral Health Treatment Team Note      Patient goal(s) for today: Pt unable to give a goal at this time   Treatment team focus/goals: Group therapy, medication management, PSA      Progress note: Pt presented with delusions and lance. Pt seen in his room disheveled and unable to hold a conversation with this writer. Pt stated that he was feeling good and he wanted to be able continue rapping. Pt responding to internal stimuli and unable to be redirected by this writer.  Pt seen speaking and laughing to himself.       LOS: 1                       Expected LOS: 5-7        Insurance info/prescription coverage:  [employer]      Date of last family contact:  None yet   Family requesting physician contact today:  no   Discharge plan:  therapist and pt will work together to determine [address] [place] [address] in the home:  Unable to be assessed at this time   Outpatient provider(s):  unable to be determined at this time      Participating treatment team members: Mindi Chapin, * (assigned SW), Faustino MEDRANO

## 2021-05-18 NOTE — CONSULTS
Consult    Subjective:     Patient is a 26y.o. year old male admit to psychiatric floor because of delusions and suicidal ideation    History of diabetes hypertension and morbid obesity patient smokes cigarettes drinks alcohol occasionally and use drugs cocaine    Patient not a good historian as any chest pain or shortness of breath nausea vomiting diarrhea constipation    Past Medical History:   Diagnosis Date    [facility] ([facility] [address]     Psychotic disorder ([facility] [address]       History reviewed. No pertinent surgical history. History reviewed. No pertinent family history.    Social History     Tobacco Use    Smoking status: Current Some Day Smoker   Substance Use Topics    Alcohol use: Not on file       Current Facility-Administered Medications   Medication Dose Route Frequency Provider Last Rate Last Admin    OLANZapine (ZyPREXA zydis) disintegrating tablet 10 mg  10 mg Oral BID Jayna Morgan MD        hydrOXYzine HCL (ATARAX) tablet 50 mg  50 mg Oral TID PRN Nicki Xie MD        LORazepam (ATIVAN) injection 1 mg  1 mg IntraMUSCular Q4H PRN Nicki Xie MD        traZODone (DESYREL) tablet 50 mg  50 mg Oral QHS PRN Nicki Xie MD   50 mg at 05/18/21 0145    acetaminophen (TYLENOL) tablet 650 mg  650 mg Oral Q4H PRN Nicki Xie MD        magnesium hydroxide (MILK OF MAGNESIA) 400 mg/5 mL oral suspension 30 mL  30 mL Oral DAILY PRN Nicki Xie MD        diphenhydrAMINE (BENADRYL) injection 50 mg  50 mg IntraMUSCular BID PRN Nicki Xie MD        ziprasidone (GEODON) 20 mg in sterile water (preservative free) 1 mL injection  20 mg IntraMUSCular Q12H PRN Nicki Xie MD        aspirin chewable tablet 81 mg  81 mg Oral DAILY Nicki Xie MD   81 mg at 05/18/21 0956    fluticasone (FLOVENT HFA) 110 mcg inhaler  1 Puff Inhalation BID RT Nicki Xie MD   Stopped at 05/17/21 2300    cloZAPine (CLOZARIL) tablet 200 mg  200 mg Oral QHS Nicki Xie MD   Stopped at 05/17/21 2300    docusate sodium (COLACE) capsule 100 mg  100 mg Oral DAILY Florencio Palomino MD   100 mg at 05/18/21 0956    FLUoxetine (PROzac) capsule 30 mg  30 mg Oral DAILY Florencio Palomino MD   30 mg at 05/18/21 0957    fluticasone propionate (FLONASE) 50 mcg/actuation nasal spray 1 Spray  1 Spray Both Nostrils DAILY Florencio Palomino MD        Multivitamins with Min No.7-FA (V-C FORTE) capsule 1 Cap  1 Cap Oral DAILY Florencio Palomino MD   1 Cap at 05/18/21 4711    hydrOXYzine pamoate (VISTARIL) capsule 25 mg  25 mg Oral BID Florencio Palomino MD   25 mg at 05/18/21 0957    loratadine (CLARITIN) tablet 10 mg  10 mg Oral DAILY Florencio Palomino MD   10 mg at 05/18/21 0957    metFORMIN (GLUCOPHAGE) tablet 1,000 mg  1,000 mg Oral QHS Florencio Palomino MD   1,000 mg at 05/18/21 0145    nicotine (NICODERM CQ) 21 mg/24 hr patch 1 Patch  1 Patch TransDERmal DAILY Florencio Palomino MD        pantoprazole (PROTONIX) tablet 40 mg  40 mg Oral ACB Florencio Palomino MD   40 mg at 05/18/21 1043    sertraline (ZOLOFT) tablet 50 mg  50 mg Oral DAILY Florencio Palomino MD   50 mg at 05/18/21 0956    albuterol (PROVENTIL HFA, VENTOLIN HFA, PROAIR HFA) inhaler 2 Puff  2 Puff Inhalation Q6H PRN Florencio Palomino MD        melatonin tablet 3 mg  3 mg Oral QHS Florencio Palomino MD   3 mg at 05/18/21 0145    polyethylene glycol (MIRALAX) packet 17 g  17 g Oral DAILY PRN Florencio Palomino MD            Allergies   Allergen Reactions    Haldol [Haloperidol Lactate] Anaphylaxis        Review of Systems:  Constitutional: Negative for chills and fever. HENT: Negative. Eyes: Negative. Respiratory: Negative. Cardiovascular: Negative. Gastrointestinal: Negative for abdominal pain and nausea. Skin: Negative. Neurological: Negative. Objective: Intake and Output:    No intake/output data recorded.   05/16 1901 - 05/18 0700  In: 1000 [I.V.:1000]  Out: -     Physical Exam:   Constitutional: pt is oriented to person, place, and time. HENT:   Head: Normocephalic and atraumatic. Eyes: Pupils are equal, round, and reactive to light. EOM are normal.   Cardiovascular: Normal rate, regular rhythm and normal heart sounds. Pulmonary/Chest: Breath sounds normal. No wheezes. No rales. Exhibits no tenderness. Abdominal: Soft. Bowel sounds are normal. There is no abdominal tenderness. There is no rebound and no guarding. Musculoskeletal: Normal range of motion. Neurological: pt is alert and oriented to person, place, and time. Alert. Normal strength. No cranial nerve deficit or sensory deficit. Displays a negative Romberg sign. Data Review:   Recent Results (from the past 24 hour(s))   AMMONIA    Collection Time: 05/17/21  3:00 PM   Result Value Ref Range    Ammonia 18 <32 umol/L       XR CHEST PORT   Final Result   No acute pulmonary process. CT HEAD WO CONT   Final Result   No acute intracranial process. Other findings as above.                  Assessment:     Principal Problem:    Schizophrenia ([facility] [address] (5/17/2021)    Schizophrenia  Hypokalemia  Acute kidney injury  Type 2 diabetes  Hypertension  Depression      Plan:   Continue sliding scale insulin  Replace potassium  Repeat BMP tomorrow for monitor renal function    Follow-up psychiatry recommendation

## 2021-05-18 NOTE — GROUP NOTE
ANG  GROUP DOCUMENTATION INDIVIDUAL Group Therapy Note Date: 5/18/2021 Group Start Time: 1300 Group End Time: 1400 Group Topic: Process Group - Inpatient [facility] Diane FRY  GROUP DOCUMENTATION GROUP Group Therapy Note Attendees: 3/13 Patients encouraged to discuss the things that have been weighing on them, the things that have been on their mind, the things that have been stressing them out. Patients encouraged to share openly and vulnerably and be supportive of their peers. Themes surrounding feeling stuck and generational trauma emerged and were discussed. Attendance: Did not attend Patient's Goal:  n/a Interventions/techniques: n/a Follows Directions: n/a Interactions: n/a Mental Status: n/a Behavior/appearance: n/a 
 
Goals Achieved: n/a Additional Notes:  Pt encouraged but did not attend group. Turner Buckner
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
## 2021-05-18 NOTE — ED NOTES
TRANSFER - OUT REPORT:    Verbal report given to Rere Rios RN (name) on [facility] Sol  being transferred to  (unit) for routine progression of care       Report consisted of patients Situation, Background, Assessment and   Recommendations(SBAR). Information from the following report(s) SBAR, ED Summary, Intake/Output, MAR and Recent Results was reviewed with the receiving nurse. Lines:   Peripheral IV 05/17/21 Left Antecubital (Active)        Opportunity for questions and clarification was provided.       Patient transported with:   [employer]

## 2021-05-19 LAB
ALBUMIN SERPL-MCNC: 3.4 G/DL (ref 3.5–5)
ALBUMIN/GLOB SERPL: 0.8 {RATIO} (ref 1.1–2.2)
ALP SERPL-CCNC: 95 U/L (ref 45–117)
ALT SERPL-CCNC: 47 U/L (ref 12–78)
ANION GAP SERPL CALC-SCNC: 8 MMOL/L (ref 5–15)
AST SERPL W P-5'-P-CCNC: 26 U/L (ref 15–37)
BASOPHILS # BLD: 0 K/UL (ref 0–0.1)
BASOPHILS NFR BLD: 0 % (ref 0–1)
BILIRUB SERPL-MCNC: 0.3 MG/DL (ref 0.2–1)
BUN SERPL-MCNC: 21 MG/DL (ref 6–20)
BUN/CREAT SERPL: 15 (ref 12–20)
CA-I BLD-MCNC: 9.2 MG/DL (ref 8.5–10.1)
CHLORIDE SERPL-SCNC: 112 MMOL/L (ref 97–108)
CO2 SERPL-SCNC: 22 MMOL/L (ref 21–32)
CREAT SERPL-MCNC: 1.39 MG/DL (ref 0.7–1.3)
DIFFERENTIAL METHOD BLD: NORMAL
EOSINOPHIL # BLD: 0.2 K/UL (ref 0–0.4)
EOSINOPHIL NFR BLD: 2 % (ref 0–7)
ERYTHROCYTE [DISTWIDTH] IN BLOOD BY AUTOMATED COUNT: 14.4 % (ref 11.5–14.5)
GLOBULIN SER CALC-MCNC: 4.1 G/DL (ref 2–4)
GLUCOSE SERPL-MCNC: 111 MG/DL (ref 65–100)
HCT VFR BLD AUTO: 43.1 % (ref 36.6–50.3)
HGB BLD-MCNC: 14.1 G/DL (ref 12.1–17)
IMM GRANULOCYTES # BLD AUTO: 0 K/UL (ref 0–0.04)
IMM GRANULOCYTES NFR BLD AUTO: 0 % (ref 0–0.5)
LYMPHOCYTES # BLD: 2.7 K/UL (ref 0.8–3.5)
LYMPHOCYTES NFR BLD: 28 % (ref 12–49)
MCH RBC QN AUTO: 27.8 PG (ref 26–34)
MCHC RBC AUTO-ENTMCNC: 32.7 G/DL (ref 30–36.5)
MCV RBC AUTO: 85 FL (ref 80–99)
MONOCYTES # BLD: 1 K/UL (ref 0–1)
MONOCYTES NFR BLD: 10 % (ref 5–13)
NEUTS SEG # BLD: 5.6 K/UL (ref 1.8–8)
NEUTS SEG NFR BLD: 60 % (ref 32–75)
NRBC # BLD: 0 K/UL (ref 0–0.01)
NRBC BLD-RTO: 0 PER 100 WBC
PLATELET # BLD AUTO: 349 K/UL (ref 150–400)
PMV BLD AUTO: 10 FL (ref 8.9–12.9)
POTASSIUM SERPL-SCNC: 3.5 MMOL/L (ref 3.5–5.1)
PROT SERPL-MCNC: 7.5 G/DL (ref 6.4–8.2)
RBC # BLD AUTO: 5.07 M/UL (ref 4.1–5.7)
SODIUM SERPL-SCNC: 142 MMOL/L (ref 136–145)
WBC # BLD AUTO: 9.5 K/UL (ref 4.1–11.1)

## 2021-05-19 PROCEDURE — 80053 COMPREHEN METABOLIC PANEL: CPT

## 2021-05-19 PROCEDURE — 36415 COLL VENOUS BLD VENIPUNCTURE: CPT

## 2021-05-19 PROCEDURE — 65220000003 HC RM SEMIPRIVATE PSYCH

## 2021-05-19 PROCEDURE — 85025 COMPLETE CBC W/AUTO DIFF WBC: CPT

## 2021-05-19 PROCEDURE — 74011250637 HC RX REV CODE- 250/637: Performed by: NURSE PRACTITIONER

## 2021-05-19 PROCEDURE — 74011250637 HC RX REV CODE- 250/637: Performed by: PSYCHIATRY & NEUROLOGY

## 2021-05-19 RX ORDER — POTASSIUM CHLORIDE 20 MEQ/1
40 TABLET, EXTENDED RELEASE ORAL
Status: COMPLETED | OUTPATIENT
Start: 2021-05-19 | End: 2021-05-19

## 2021-05-19 RX ADMIN — Medication 1 CAPSULE: at 11:16

## 2021-05-19 RX ADMIN — SERTRALINE HYDROCHLORIDE 50 MG: 50 TABLET ORAL at 11:16

## 2021-05-19 RX ADMIN — OLANZAPINE 10 MG: 5 TABLET, ORALLY DISINTEGRATING ORAL at 21:26

## 2021-05-19 RX ADMIN — LORATADINE 10 MG: 10 TABLET ORAL at 11:16

## 2021-05-19 RX ADMIN — POTASSIUM CHLORIDE 40 MEQ: 1500 TABLET, EXTENDED RELEASE ORAL at 11:16

## 2021-05-19 RX ADMIN — OLANZAPINE 10 MG: 5 TABLET, ORALLY DISINTEGRATING ORAL at 11:17

## 2021-05-19 RX ADMIN — DOCUSATE SODIUM 100 MG: 100 CAPSULE, LIQUID FILLED ORAL at 11:16

## 2021-05-19 RX ADMIN — FLUOXETINE 30 MG: 20 CAPSULE ORAL at 11:15

## 2021-05-19 RX ADMIN — HYDROXYZINE PAMOATE 25 MG: 25 CAPSULE ORAL at 21:26

## 2021-05-19 RX ADMIN — PANTOPRAZOLE SODIUM 40 MG: 40 TABLET, DELAYED RELEASE ORAL at 11:16

## 2021-05-19 RX ADMIN — FLUTICASONE PROPIONATE 1 PUFF: 110 AEROSOL, METERED RESPIRATORY (INHALATION) at 21:29

## 2021-05-19 RX ADMIN — MELATONIN TAB 3 MG 3 MG: 3 TAB at 21:26

## 2021-05-19 RX ADMIN — HYDROXYZINE PAMOATE 25 MG: 25 CAPSULE ORAL at 11:17

## 2021-05-19 RX ADMIN — CLOZAPINE 200 MG: 100 TABLET ORAL at 21:26

## 2021-05-19 RX ADMIN — ASPIRIN 81 MG: 81 TABLET, CHEWABLE ORAL at 11:16

## 2021-05-19 NOTE — BH NOTES
TDO DISPO        Pt committed for up to 10 days on 5/19/21 per [employer], represented by Mr. Indira Marcus. Paperwork placed on pt's chart.

## 2021-05-19 NOTE — BH NOTES
Behavioral Health Treatment Team Note     Patient goal(s) for today: Pt was unable to give a goal at this time. Treatment team focus/goals: group therapy, medication management    Progress note: Pt presented with delusions and an elevated mood. Pt speaking and laughing to himself in [place] at inappropriately. Pt responding to internal stimuli and told this writer that he was hearing voices. Pt stated that his father is from [facility] and his mother was from [facility]. [address] stated that he was born in [place] and that his mother lives in [address]. Pt stated that he was admitted for a 'schizophrenic episode' due to his 'really bad dreams'. Pt denies any AH. Pt in need of inpatient level of care due to continued delusions, elevated and psychotic presentation. Writer attempting to gain information for a full PSA. LOS:  2  Expected LOS: 10    Insurance info/prescription coverage:  [employer]  Date of last family contact:  None yet   Family requesting physician contact today:  no  Discharge plan:  Pt and therapist will work together to determine [address] [facility] in the home:  Unable to be determined at this time    Outpatient provider(s):   Will be determined prior to [employer]     Participating treatment team members: Catie Philip, * (assigned SW), Claudia Watson

## 2021-05-19 NOTE — GROUP NOTE
IP  GROUP DOCUMENTATION INDIVIDUAL Group Therapy Note Date: 5/19/2021 Group Start Time: 1100 Group End Time: 9864 Group Topic: Education Group - Inpatient [facility] 2  NON ACUTE [facility] GROUP DOCUMENTATION GROUP Group Therapy Note Attendees: 6 out of 11 
 
11am Psycho-Education Group Topic: Suicide Safety Planning Patients were invited to group and educated on how to complete a Suicide Safety Plan for times of crises. Patients were asked to complete the plan in its entirety and to share their responses with the group as they feel comfortable. Lastly, patients were encouraged to listen respectfully to and be supportive of their peers. Attendance: Did not attend Patient's Goal:  N/A Interventions/techniques: Other N/A Follows Directions: Other N/A Interactions: Other N/A Mental Status: Other N/A Behavior/appearance: N/A Goals Achieved: N/A Additional Notes:  Pt did not attend group despite having been encouraged to do so. [place]

## 2021-05-19 NOTE — BH NOTES
Pt.is up and visible on unit, affect is flat, appetite good, appearance is disheveled, denies feeling suicidal, pt.smiles and talks to self frequently poor insight and judgement appears anxious at times, no concerns voiced, remains on close observation.

## 2021-05-19 NOTE — PROGRESS NOTES
Problem: Psychosis  Goal: *STG: Remains safe in hospital  Outcome: Progressing Towards Goal     Problem: Psychosis  Goal: *STG/LTG: Complies with medication therapy  Outcome: Progressing Towards Goal     Patient has been safe so far this shift. Patient was medication compliant. Patient remains on close observation. Patient has been in his room most of the shift. He is very difficult to interview due to his psychosis. He took a shower that was over an hour long. He flooded the floor in the bathroom due to showering with the curtain open. The wet floor was cleaned up . Patient was assisted with dressing and drying off due to his being disorganized. Patient mumbles to himself. He smiles  Inappropriately. Patient was medication compliant. His accuchek was 119. Since going to bed, patient slept soundly for about two hours. He awakened and received fluids. Continue to assess.

## 2021-05-19 NOTE — BH NOTES
PSA ATTEMPT     Writer attempted to complete PSA with this pt. Pt was able to give very little info and then after two minutes did not speak any more. Writer will attempt at another time.

## 2021-05-20 PROCEDURE — 74011250637 HC RX REV CODE- 250/637: Performed by: PSYCHIATRY & NEUROLOGY

## 2021-05-20 PROCEDURE — 65220000003 HC RM SEMIPRIVATE PSYCH

## 2021-05-20 RX ADMIN — FLUTICASONE PROPIONATE 1 PUFF: 110 AEROSOL, METERED RESPIRATORY (INHALATION) at 08:16

## 2021-05-20 RX ADMIN — ASPIRIN 81 MG: 81 TABLET, CHEWABLE ORAL at 08:15

## 2021-05-20 RX ADMIN — HYDROXYZINE PAMOATE 25 MG: 25 CAPSULE ORAL at 08:15

## 2021-05-20 RX ADMIN — MELATONIN TAB 3 MG 3 MG: 3 TAB at 21:19

## 2021-05-20 RX ADMIN — Medication 1 CAPSULE: at 08:15

## 2021-05-20 RX ADMIN — OLANZAPINE 10 MG: 5 TABLET, ORALLY DISINTEGRATING ORAL at 21:19

## 2021-05-20 RX ADMIN — FLUOXETINE 30 MG: 20 CAPSULE ORAL at 08:15

## 2021-05-20 RX ADMIN — FLUTICASONE PROPIONATE 1 PUFF: 110 AEROSOL, METERED RESPIRATORY (INHALATION) at 21:19

## 2021-05-20 RX ADMIN — FLUTICASONE PROPIONATE 1 SPRAY: 50 SPRAY, METERED NASAL at 09:00

## 2021-05-20 RX ADMIN — HYDROXYZINE HYDROCHLORIDE 50 MG: 50 TABLET, FILM COATED ORAL at 11:56

## 2021-05-20 RX ADMIN — DOCUSATE SODIUM 100 MG: 100 CAPSULE, LIQUID FILLED ORAL at 08:15

## 2021-05-20 RX ADMIN — HYDROXYZINE PAMOATE 25 MG: 25 CAPSULE ORAL at 21:19

## 2021-05-20 RX ADMIN — CLOZAPINE 200 MG: 100 TABLET ORAL at 21:19

## 2021-05-20 RX ADMIN — LORATADINE 10 MG: 10 TABLET ORAL at 08:15

## 2021-05-20 RX ADMIN — SERTRALINE HYDROCHLORIDE 50 MG: 50 TABLET ORAL at 08:24

## 2021-05-20 RX ADMIN — PANTOPRAZOLE SODIUM 40 MG: 40 TABLET, DELAYED RELEASE ORAL at 08:25

## 2021-05-20 RX ADMIN — OLANZAPINE 10 MG: 5 TABLET, ORALLY DISINTEGRATING ORAL at 08:24

## 2021-05-20 NOTE — BH NOTES
Dx: Schizophrenia    Affect full. Continuosly talking very loud to himself, in [place] and another language. Up at the beginning of the shift; c/o being thirsty and hungry. Accepted a pitcher of water. Consumed 100% of bfkt; observed eating with his hands. Cooperative when encouraged to utilize his spoonfork. Consumed 100% of lunch. Compliant with scheduled meds. Appearance disheveled; encouraged to shower and change his gowns. Encouraged to attend groups. Q 15 mins checks maintained, for safety. Pt showered and changed his gowns. Refused to attend groups, although encouraged. Observed pacing the hallway, and singing/talking, to himself. Pleasant; approached staff and requested whatever he needed. No interactions with peers.

## 2021-05-20 NOTE — PROGRESS NOTES
Problem: Psychosis  Goal: *STG: Remains safe in hospital  Outcome: Progressing Towards Goal     Problem: Psychosis  Goal: *STG/LTG: Complies with medication therapy  Outcome: Progressing Towards Goal     Patient  has been safe so far this shift. Patient was medication compliant. Patient remains on close observation. Patient has been in bed all shift. He was sleeping except to receive hs medications and to have his VS taken. Patient has a very unkempt appearance. Patient has not voiced depression, anxiety, SI, HI or hallucinations. Patient was medication compliant. Continue to assess. Since going to bed, patient has been laying down quietly with his eyes closed. He has been snoring.

## 2021-05-20 NOTE — PROGRESS NOTES
Zarina Winters came in and and participated in the group and answered questions on how he was feeling and he responded

## 2021-05-20 NOTE — PROGRESS NOTES
Progress Note  Date:2021       Room:[facility]  Patient Name:Jon Fierro     YOB: 1995     Age:25 y.o. Subjective    Subjective   Pt asleep difficult to wake up this morning  Talking to self and laugghing to self the previous day  mse- in bed no acute distress, poor insight  Review of Systems  Objective         Vitals Last 24 Hours:  TEMPERATURE:  Temp  Av.2 °F (36.8 °C)  Min: 98 °F (36.7 °C)  Max: 98.4 °F (36.9 °C)  RESPIRATIONS RANGE: Resp  Av.8  Min: 17  Max: 18  PULSE OXIMETRY RANGE: SpO2  Av %  Min: 96 %  Max: 96 %  PULSE RANGE: Pulse  Av.5  Min: 79  Max: 107  BLOOD PRESSURE RANGE: Systolic (92VKY), GTX:369 , Min:140 , YWU:251   ; Diastolic (54YOY), BVU:59, Min:82, Max:92    I/O (24Hr): No intake or output data in the 24 hours ending 21 2345  Objective  Labs/Imaging/Diagnostics    Labs:  CBC:  Recent Labs     21  0618 21  0928   WBC 9.5 12.5*   RBC 5.07 4.85   HGB 14.1 13.6   HCT 43.1 40.7   MCV 85.0 83.9   RDW 14.4 13.9    303     CHEMISTRIES:  Recent Labs     21  0618 21  1035 21  0928     --  135*   K 3.5  --  3.1*   *  --  104   CO2 22  --  20*   BUN 21*  --  19   CA 9.2  --  8.4*   MG  --  2.4  --    PT/INR:No results for input(s): INR, INREXT in the last 72 hours. No lab exists for component: PROTIME  APTT:No results for input(s): APTT in the last 72 hours. LIVER PROFILE:  Recent Labs     2118   AST 26   ALT 47     Lab Results   Component Value Date/Time    ALT (SGPT) 47 2021 06:18 AM    AST (SGOT) 26 2021 06:18 AM    Alk. phosphatase 95 2021 06:18 AM    Bilirubin, total 0.3 2021 06:18 AM       Imaging Last 24 Hours:  No results found.   Assessment//Plan   Principal Problem:    Schizophrenia ([facility] [address] (2021)      Assessment & Plan    Current Facility-Administered Medications:     OLANZapine (ZyPREXA zydis) disintegrating tablet 10 mg, 10 mg, Oral, BID, Barbara Gonzales MD, 10 mg at 05/19/21 2126    hydrOXYzine HCL (ATARAX) tablet 50 mg, 50 mg, Oral, TID PRN, Marcie Aldridge MD, 50 mg at 05/18/21 1939    LORazepam (ATIVAN) injection 1 mg, 1 mg, IntraMUSCular, Q4H PRN, Marcie Aldridge MD    traZODone (DESYREL) tablet 50 mg, 50 mg, Oral, QHS PRN, Marcie Aldridge MD, 50 mg at 05/18/21 0145    acetaminophen (TYLENOL) tablet 650 mg, 650 mg, Oral, Q4H PRN, Renetta Rocha MD    magnesium hydroxide (MILK OF MAGNESIA) 400 mg/5 mL oral suspension 30 mL, 30 mL, Oral, DAILY PRN, Renetta Rocha MD    diphenhydrAMINE (BENADRYL) injection 50 mg, 50 mg, IntraMUSCular, BID PRN, Marcie Aldridge MD    ziprasidone (GEODON) 20 mg in sterile water (preservative free) 1 mL injection, 20 mg, IntraMUSCular, Q12H PRN, Marcie Aldridge MD    aspirin chewable tablet 81 mg, 81 mg, Oral, DAILY, Marcie Aldridge MD, 81 mg at 05/19/21 1116    fluticasone (FLOVENT HFA) 110 mcg inhaler, 1 Puff, Inhalation, BID RT, Marcie Aldridge MD, 1 Puff at 05/19/21 2129    cloZAPine (CLOZARIL) tablet 200 mg, 200 mg, Oral, QHS, Marcie Aldridge MD, 200 mg at 05/19/21 2126    docusate sodium (COLACE) capsule 100 mg, 100 mg, Oral, DAILY, Marcie Aldridge MD, 100 mg at 05/19/21 1116    FLUoxetine (PROzac) capsule 30 mg, 30 mg, Oral, DAILY, Marcie Aldridge MD, 30 mg at 05/19/21 1115    fluticasone propionate (FLONASE) 50 mcg/actuation nasal spray 1 Spray, 1 Spray, Both Nostrils, DAILY, Marcie Aldridge MD    Multivitamins with Min No.7-FA (V-C FORTE) capsule 1 Cap, 1 Capsule, Oral, DAILY, Marcie Aldridge MD, 1 Capsule at 05/19/21 1116    hydrOXYzine pamoate (VISTARIL) capsule 25 mg, 25 mg, Oral, BID, Marcie Aldridge MD, 25 mg at 05/19/21 2126    loratadine (CLARITIN) tablet 10 mg, 10 mg, Oral, DAILY, Marcie Aldridge MD, 10 mg at 05/19/21 1116    nicotine (NICODERM CQ) 21 mg/24 hr patch 1 Patch, 1 Patch, TransDERmal, DAILY, Marcie Aldridge MD, 1 Patch at 05/19/21 1117    pantoprazole (PROTONIX) tablet 40 mg, 40 mg, Oral, ACB, Ninoska ANDREWS MD, 40 mg at 05/19/21 1116    sertraline (ZOLOFT) tablet 50 mg, 50 mg, Oral, DAILY, Ninoska ANDREWS MD, 50 mg at 05/19/21 1116    albuterol (PROVENTIL HFA, VENTOLIN HFA, PROAIR HFA) inhaler 2 Puff, 2 Puff, Inhalation, Q6H PRN, Chaz Rocha MD    melatonin tablet 3 mg, 3 mg, Oral, QHS, Arlin Beavers MD, 3 mg at 05/19/21 2126    polyethylene glycol (MIRALAX) packet 17 g, 17 g, Oral, DAILY PRN, Arlin Beavers MD  Electronically signed by Ely Herbert MD on 5/19/2021 at 11:45 PM

## 2021-05-20 NOTE — BH NOTES
COLLATERAL     Writer spoke with the pts PCP, Luis Eduardo Jones, after Dr. Duane Carrie overrided consent to speak with him. He stated that he has only seen the pt one time in the past. He stated that the pt was at [facility] for four months at then end of 2020 to beginning of 2021. He provided this writer with the contact info for the pts group home. Writer spoke with Stefany, from [place] the pts group home. She stated that pt had seizure on Saturday and that they brought him to the hospital. She stated that he had alchol and marijuana in his system at this time. She stated that he returned from the hospital and the next morning was seen laying on his bed unresponsive, the pt was then transferred to the hospital. Stefany stated that at his baseline the pt is very pleasant and sometimes recluse. She reported that the pt has not presented this way since being at the group home. Arleth reported that the pt has been off of his Clozan for about three days prior to admission. She provided this writer with the pts fathers number- [phone]. She also stated the pt has a CM through [facility] but did not know her name or number.

## 2021-05-20 NOTE — PROGRESS NOTES
Spiritual Care Assessment/Progress Note  [facility]      NAME: Mariaelena Hwang      MRN: [id number]  AGE: 26 y.o.  SEX: male  Yazidism Affiliation: No Amish   Language: English     5/20/2021     Total Time (in minutes): 8     Spiritual Assessment begun in [facility] ACUTE through conversation with:         [x]Patient        [] Family    [] Friend(s)        Reason for Consult: Request by staff     Spiritual beliefs: (Please include comment if needed)     [] Identifies with a juan f tradition:         [] Supported by a juan f community:            [] Claims no spiritual orientation:           [] Seeking spiritual identity:                [] Adheres to an individual form of spirituality:           [x] Not able to assess:                           Identified resources for coping:      [] Prayer                               [] Music                  [] Guided Imagery     [] Family/friends                 [] Pet visits     [] Devotional reading                         [x] Unknown     [] Other:                                              Interventions offered during this visit: (See comments for more details)    Patient Interventions: Affirmation of emotions/emotional suffering, Affirmation of juan f, Coping skills reviewed/reinforced, Initial/Spiritual assessment, patient floor, Normalization of emotional/spiritual concerns           Plan of Care:     [] Support spiritual and/or cultural needs    [] Support AMD and/or advance care planning process      [] Support grieving process   [] Coordinate Rites and/or Rituals    [] Coordination with community clergy   [] No spiritual needs identified at this time   [] Detailed Plan of Care below (See Comments)  [] Make referral to Music Therapy  [] Make referral to Pet Therapy     [] Make referral to Addiction services  [] Make referral to [facility]  [] Make referral to Spiritual Care Partner  [] No future visits requested        [x] Follow up upon further referrals     Comments:  Visited patient in the [address] [place] [address] unit for staff request.  Patient was alone during the visit. Patient accepted visit from the  but did not engage much. He stated that he has been in the hospital for 5 days, he is doing okay and that he believes in God but he seemed occupied with eating at the time of the visit. I listened with empathy while exploring his needs and resources as well as attempting to facilitate storytelling. I provided supportive presence and advised of  availability. Chaplains will follow up if further referrals are requested. Chaplain Evan Weiss M.Div.    can be reached by [facility]  [phone]

## 2021-05-20 NOTE — BH NOTES
PSYCHOSOCIAL ASSESSMENT  :Patient identifying info:   Toney De La Fuente is a 26 y.o., male admitted 5/17/2021  9:13 AM     Presenting problem and precipitating factors: Pt is a 26year old [place] male TDO'd for psychosis and delusions. Pt stated that he was experiencing a 'schizophrenic episode'. Pts group home stated that the pt had a seizure on Saturday and was brought to the hospital. He then was brought back to the hospital on Sunday after being found unresponsive in his bed. Mental status assessment: Pt cooporative and pleasant with this writer. Pt responding to internal stimuli during conversation and seemed to be speaking to himself. Pt oriented to time and place. Strengths: Pt unable to identify a strength at this time. Collateral information: Pt gave verbal consent to speak to his father, . Aida Donis overrode to speak to Dr and group home. Current psychiatric /substance abuse providers and contact info: Pt has services through [facility] and is at [facility]     Previous psychiatric/substance abuse providers and response to treatment: Pt hospitalized at [address] [place] [address] due to symptoms of schizophrenia, pt also hospitalized at [facility]     Family history of mental illness or substance abuse: Pt reports that his mother uses substances, alcohol, and his mother suffers with bipolar     Substance abuse history:    Social History     Tobacco Use    Smoking status: Current Some Day Smoker   Substance Use Topics    Alcohol use: Not on file     Pt reports using alcohol two to three times a week and began using at age 26. History of biomedical complications associated with substance abuse : n/a    Patient's current acceptance of treatment or motivation for change: motivated     Family constellation: Pt reports having a one and a [de-identified] year old daughter who lives with her mother in [place]. Pt reported having a mother, father two brothers and a sister.      Is significant other involved? no      Describe support system: dad, brothers, sister     Describe living arrangements and home environment: [facility] [address] Problems  Never Reviewed        Codes Class Noted POA    * (Principal) Schizophrenia ([facility] [address] ICD-10-CM: F20.9  ICD-9-CM: 295.90  2021 Unknown              Trauma history: Pt denies     Legal issues: Unable to be assessed at this time     History of [facility] service: no    Financial status: Unable to be assessed at this time     Latter day/cultural factors: Pt requested to speak with the silvia     Education/work history: 12th grade and some college    Have you been licensed as a health care professional (current or ): no    Leisure and recreation preferences: Unable to be assessed at this time     Describe coping skills: Unable to be assessed at this time     Bhargavi Velazquez  2021

## 2021-05-20 NOTE — GROUP NOTE
[facility] GROUP DOCUMENTATION INDIVIDUAL Group Therapy Note Date: 5/20/2021 Group Start Time: 1500 Group End Time: 5898 Group Topic: Process Group - Inpatient SRM 2 BH NON ACUTE Kelly Shed [facility] GROUP DOCUMENTATION GROUP Group Therapy Note Attendees: 5 out of 15 
 
3pm Combination Process Group Patients were invited to group and encouraged to share their thoughts, feelings and emotions. Patients were asked to speak on the topics of: goal of the day, favorite quality about themselves, and what if anything would they change about themselves. Pt were asked to listen respectfully to and be supportive of their peers. Attendance: Did not attend Patient's Goal:  N/A Interventions/techniques: Other N/A Follows Directions: Other N/A Interactions: Other N/A Mental Status: Other N/A Behavior/appearance: N/A Goals Achieved: N/A Additional Notes:  Pt did not attend group despite having been encouraged to do so. [place]

## 2021-05-20 NOTE — BH NOTES
SRM Recreational Therapy Assessment    Orientation:  Person    Reason for Admission:  PT unable to complete RT Assessment. Pt approached x3 to complete and was unable to complete questions due to current mental status. Pt assessment completed from the chart. Pt admitted under a TDO. Pt presented on admission according to chart with delusions and suicidal ideations. Pt Laughs to self, hears voices telling him funny things. Voices telling to kill self. Has cuts on wrists due to cutting self with razor. Pt has been lethargic and unresponsive at [facility] and responding to internal stimuli. PT withdrawn on unit and not responding to CTRS prompts to respond/communicate for RT Assessment.       Medical Precautions / Conditions:  Asthma, Diabetes, HTN and unable to respond,chart indicated health conditions    Impairments:     Vision:  Wears Glasses Unable to identify      Wears Contacts unable to identify      Are they with Patient unable to identify     Hearing Aids unable to identify     Utilization of Ambulatory Devices:  did not identify    Health Problems Preventing Participation in Activities:  Unable to Answer  How:  n/a    Leisure Interest Checklist:  unable to identify    Does patient participate in leisure activities:  Unable to Answer    Setting:  Alone    Other Activities / Skills / [place]:  Did not identify  Do emotions interfere with leisure activities / lifestyles:  Unable to Answer    When engaging in leisure activities, do you forget worries:  Unable to Answer    Do you belong to a Shinto:  Unable to Answer    Are you active in [place] activities:  Unable to Answer    Typical Day:  Did not identify  Strengths:  Housing and Lives in [facility]    Limitations / Barriers:  Hallucinations and poor impulse control, poor insight and judegment    Treatment Modalities:  Stress Management, Coping Skills, Symptom Recognition, Healthy Thinking, Mood Management and Leisure Skills    Patient Educational  Needs:  Skills to recognize and challenge problematic thinking, Identify positive coping strategies and skills to manage symptoms or moods, Leisure education and Recognition of symptoms and signs    Focus of Treatment:  Introduce positive outlets for self expression and Introduce and encourage the exploration of alternative coping skills    Summary:  PT unable to complete RT Assessment. Pt approached x3 to complete and was unable to complete questions due to current mental status. Pt assessment completed from the chart. Pt admitted under a TDO. Pt presented on admission according to chart with delusions and suicidal ideations. Pt Laughs to self, hears voices telling him funny things. Voices telling to kill self. Has cuts on wrists due to cutting self with razor. Pt has been lethargic and unresponsive at [facility] and responding to internal stimuli. PT withdrawn on unit and not responding to CTRS prompts to respond/communicate for RT Assessment.

## 2021-05-21 PROCEDURE — 65220000003 HC RM SEMIPRIVATE PSYCH

## 2021-05-21 PROCEDURE — 74011250637 HC RX REV CODE- 250/637: Performed by: PSYCHIATRY & NEUROLOGY

## 2021-05-21 RX ADMIN — HYDROXYZINE PAMOATE 25 MG: 25 CAPSULE ORAL at 08:05

## 2021-05-21 RX ADMIN — HYDROXYZINE PAMOATE 25 MG: 25 CAPSULE ORAL at 21:02

## 2021-05-21 RX ADMIN — ASPIRIN 81 MG: 81 TABLET, CHEWABLE ORAL at 08:05

## 2021-05-21 RX ADMIN — OLANZAPINE 10 MG: 5 TABLET, ORALLY DISINTEGRATING ORAL at 08:05

## 2021-05-21 RX ADMIN — TRAZODONE HYDROCHLORIDE 50 MG: 50 TABLET ORAL at 21:01

## 2021-05-21 RX ADMIN — MELATONIN TAB 3 MG 3 MG: 3 TAB at 21:01

## 2021-05-21 RX ADMIN — FLUOXETINE 30 MG: 20 CAPSULE ORAL at 08:05

## 2021-05-21 RX ADMIN — SERTRALINE HYDROCHLORIDE 50 MG: 50 TABLET ORAL at 08:06

## 2021-05-21 RX ADMIN — DOCUSATE SODIUM 100 MG: 100 CAPSULE, LIQUID FILLED ORAL at 08:05

## 2021-05-21 RX ADMIN — OLANZAPINE 10 MG: 5 TABLET, ORALLY DISINTEGRATING ORAL at 21:01

## 2021-05-21 RX ADMIN — Medication 1 CAPSULE: at 08:05

## 2021-05-21 RX ADMIN — PANTOPRAZOLE SODIUM 40 MG: 40 TABLET, DELAYED RELEASE ORAL at 08:05

## 2021-05-21 RX ADMIN — FLUTICASONE PROPIONATE 1 PUFF: 110 AEROSOL, METERED RESPIRATORY (INHALATION) at 21:02

## 2021-05-21 RX ADMIN — LORATADINE 10 MG: 10 TABLET ORAL at 08:05

## 2021-05-21 RX ADMIN — CLOZAPINE 200 MG: 100 TABLET ORAL at 21:01

## 2021-05-21 NOTE — BH NOTES
Behavioral Health Treatment Team Note     Patient goal(s) for today: Pt unable to give a goal at this time   Treatment team focus/goals: Group therapy, medication management     Progress note: Pt presented with delusions and flat affect. Pt endorsed hearing voices. Pt exteremly drowsy and fell asleep, snoring when talking to this writer. Pt asked if this writer spoke [address] Pt seemed confused and delusional. Pt unable to speak clearly to this writer. Pt in inpatient level of care due to continued delusions and paranoia and bizarre presentation. LOS:  4  Expected LOS: 5-7    Insurance info/prescription coverage: [facility]   Date of last family contact:  5/21/21  Family requesting physician contact today:  no  Discharge plan:  Pt will dc to [place]   Guns in the home:  no   Outpatient provider(s):   Will be determined prior to [employer]     Participating treatment team members: Roel Field, * (assigned SW), Jose Armando Winkler

## 2021-05-21 NOTE — BH NOTES
Patient case discussed with the treatment team discussed with the nursing staff patient is in his room without talking to himself laughing inappropriately hallucinating he says he hears his jokes.   Says his  but not working taking medication redirection to take shower change close when the staff told him but did not want to go to the group therapy he is taking clonazepam and Prozac some time for the medication to work no side effects not aggressive not self-destructive rather inappropriate Lauffer his vital signs today temperature 97.6 pulse 110 blood pressure 135/94 SPO2 99 respirations 17 he is blood count WBC is 9.5 thousand platelets neutrophils lymphocytes all within normal range no new labs but CBC from yesterday was reviewed within the normal range no infection afebrile patient is still psychotic manicky continued inpatient level of care indicated

## 2021-05-21 NOTE — PROGRESS NOTES
Problem: Psychosis  Goal: *STG: Decreased hallucinations  Outcome: Not Met     Problem: Psychosis  Goal: *STG: Remains safe in hospital  Outcome: Progressing Towards Goal     Patient continues to admit to having auditory hallucinations. Patient has remained safe in the hospital so far this shift.

## 2021-05-21 NOTE — GROUP NOTE
IP  GROUP DOCUMENTATION INDIVIDUAL Group Therapy Note Date: 5/21/2021 Group Start Time: 1500 Group End Time: 6695 Group Topic: Process Group - Inpatient SRM 2 BH NON ACUTE Leontine Nickel [facility] GROUP DOCUMENTATION GROUP Group Therapy Note Attendees: 7 out of 14 Patients were invited to group and encouraged to discuss their thoughts, feelings, and emotions. Patients were also asked to give their goal of the day and share with the group. Patients were asked to listen respectfully to and be supportive of their peers. Attendance: Did not attend Patient's Goal:  N/A Interventions/techniques: Other N/A Follows Directions: Other N/A Interactions: Other N/A Mental Status: Other N/A Behavior/appearance: N/A Goals Achieved: N/A Additional Notes: Pt did not attend group despite having been encouraged to do so. [place]

## 2021-05-21 NOTE — BH NOTES
Patient remains on close observation. Patient has been alert, oriented to self and place, cooperative and pleasant. Patient has been walking in his room this shift. He has been talking loudly to himself. He said is fine. When asked if he was hearing voices he said, \"yes. \"  He said they make him laugh and tell him to have sex. Patient has not voiced any SI or HI. Medication compliant. Continue to assess. Patient is presently still awake, talking to himself and pacing in his room. He denied any issues. He requested a snack and received Diet Coke and Cheese Crackers.

## 2021-05-21 NOTE — BH NOTES
Pt.is up and visible on unit, affect is flat,appetite good,appearance is disheveled, laughs inappropriately, pt. Is observed talking to self.speaks in different language. no interaction with peers or staff, no aggressive behaviors at present. Pleasant upon approach. no other concerns voiced, remains on close observation.

## 2021-05-22 PROCEDURE — 74011250637 HC RX REV CODE- 250/637: Performed by: PSYCHIATRY & NEUROLOGY

## 2021-05-22 PROCEDURE — 65220000003 HC RM SEMIPRIVATE PSYCH

## 2021-05-22 RX ORDER — LORAZEPAM 1 MG/1
1 TABLET ORAL
Status: DISCONTINUED | OUTPATIENT
Start: 2021-05-22 | End: 2021-06-08 | Stop reason: HOSPADM

## 2021-05-22 RX ADMIN — Medication 1 CAPSULE: at 08:57

## 2021-05-22 RX ADMIN — DOCUSATE SODIUM 100 MG: 100 CAPSULE, LIQUID FILLED ORAL at 08:57

## 2021-05-22 RX ADMIN — ASPIRIN 81 MG: 81 TABLET, CHEWABLE ORAL at 08:57

## 2021-05-22 RX ADMIN — OLANZAPINE 10 MG: 5 TABLET, ORALLY DISINTEGRATING ORAL at 08:57

## 2021-05-22 RX ADMIN — LORAZEPAM 1 MG: 1 TABLET ORAL at 18:05

## 2021-05-22 RX ADMIN — PANTOPRAZOLE SODIUM 40 MG: 40 TABLET, DELAYED RELEASE ORAL at 09:05

## 2021-05-22 RX ADMIN — FLUTICASONE PROPIONATE 1 PUFF: 110 AEROSOL, METERED RESPIRATORY (INHALATION) at 21:05

## 2021-05-22 RX ADMIN — MELATONIN TAB 3 MG 3 MG: 3 TAB at 21:04

## 2021-05-22 RX ADMIN — FLUOXETINE 30 MG: 20 CAPSULE ORAL at 08:57

## 2021-05-22 RX ADMIN — HYDROXYZINE PAMOATE 25 MG: 25 CAPSULE ORAL at 08:57

## 2021-05-22 RX ADMIN — FLUTICASONE PROPIONATE 1 PUFF: 110 AEROSOL, METERED RESPIRATORY (INHALATION) at 08:58

## 2021-05-22 RX ADMIN — LORATADINE 10 MG: 10 TABLET ORAL at 08:57

## 2021-05-22 RX ADMIN — CLOZAPINE 200 MG: 100 TABLET ORAL at 21:04

## 2021-05-22 RX ADMIN — OLANZAPINE 10 MG: 5 TABLET, ORALLY DISINTEGRATING ORAL at 21:04

## 2021-05-22 RX ADMIN — HYDROXYZINE PAMOATE 25 MG: 25 CAPSULE ORAL at 21:04

## 2021-05-22 RX ADMIN — TRAZODONE HYDROCHLORIDE 50 MG: 50 TABLET ORAL at 21:04

## 2021-05-22 RX ADMIN — SERTRALINE HYDROCHLORIDE 50 MG: 50 TABLET ORAL at 08:57

## 2021-05-22 RX ADMIN — FLUTICASONE PROPIONATE 1 SPRAY: 50 SPRAY, METERED NASAL at 09:05

## 2021-05-22 NOTE — GROUP NOTE
ANG  GROUP DOCUMENTATION INDIVIDUAL Group Therapy Note Date: 5/22/2021 Group Start Time: 1300 Group End Time: 3554 Group Topic: Process Group - Inpatient SRM CARE MANAGEMENT Miryam Godoy [facility] GROUP DOCUMENTATION GROUP Group Therapy Note Pts encouraged to attend process group therapy. Pts encourages to share thoughts feelings and emotions in regard to mental health. Attendees: 2 Attendance: Did not attend MargaThatcher Ananya

## 2021-05-22 NOTE — BH NOTES
Patient alert and cooperative. Ambulating in hallway, room, solarium. Denies SI/HI. Appears to respond to internal stimuli, noted talking to self. Took medication as ordered. Resting in bed at this time.

## 2021-05-22 NOTE — BH NOTES
Behavioral Health Treatment Team Note     Patient goal(s) for today: Pt unable to give a goal at this time. Treatment team focus/goals: group therapy, medication management     Progress note: Pt presented disheveled and confused when speaking to this writer. Pt stated he was feeling okay however he was having anxiety and would like a PRN. Pt unable to communicate further with this writer as he fell asleep shortly after. Pt in need of inpatient level of care do to continued psychotic presentation. LOS:  5  Expected LOS: 5-7    Insurance info/prescription coverage:  [address]   Date of last family contact:  5/22/21  Family requesting physician contact today:  no  Discharge plan:  Pt will fc to [employer] in the home:  no   Outpatient provider(s):   Will be determined prior to [employer]     Participating treatment team members: Nathaly Montano, * (assigned SW), Jayda Hare

## 2021-05-22 NOTE — BH NOTES
Progress note for May 21, 2021 patient case discussed in the treatment team patient seen chart reviewed still talking himself hallucinating but not as intense not as vigorous calmer the inappropriate laughter has stopped.   He is still no agitation no aggression or not homicidal his vital signs for May 21 temperature 97.6 pulse 94 blood pressure 147/97 respiration 18 no new labs resulted results for May 19 WBC is normal range urine analysis and cell CMP slightly elevated chloride 112 glucose 111 creatinine 1.39 patient making slow progress still looks anxious perplexed that the hallucinations seem to be clearing continued inpatient level of care indicated for stabilization and disposition

## 2021-05-22 NOTE — BH NOTES
DAYSHIFT NOTE:     Patient is up early and walking around the unit. Patient is up for his breakfast and is asking for soda early. Patient will state that his stomach hurts in order to get a soda early. Patient is medication compliant. While standing at nurses station to get his medications patient was restless and figdgety, patient was moving his hands and marching in place and he stated that he was a [employer]. Patient states that he has mild nightmares. Patient states the voices are better and are now whispers. Patient continues to talk to himself in full conversations. Patient is not as loud as he has been with talking to himself. Patient asked for stuff to take a shower with this morning. Patient encouraged to wear socks while walking around on this unit. Patient has a good appetite. Patient is definitely responding to internal stimuli and will talk to himself while in front of staff. Patient will sometimes laugh and make strange noises also. Patient will ask for sodas all throughout the day and soda has to be limited. Close observations continued to ensure patient safety. Will continue to monitor.

## 2021-05-23 PROCEDURE — 74011250637 HC RX REV CODE- 250/637: Performed by: PSYCHIATRY & NEUROLOGY

## 2021-05-23 PROCEDURE — 65220000003 HC RM SEMIPRIVATE PSYCH

## 2021-05-23 RX ORDER — MAG HYDROX/ALUMINUM HYD/SIMETH 200-200-20
30 SUSPENSION, ORAL (FINAL DOSE FORM) ORAL
Status: DISCONTINUED | OUTPATIENT
Start: 2021-05-23 | End: 2021-06-08 | Stop reason: HOSPADM

## 2021-05-23 RX ORDER — ZIPRASIDONE HYDROCHLORIDE 20 MG/1
20 CAPSULE ORAL
Status: DISCONTINUED | OUTPATIENT
Start: 2021-05-23 | End: 2021-06-08 | Stop reason: HOSPADM

## 2021-05-23 RX ADMIN — PANTOPRAZOLE SODIUM 40 MG: 40 TABLET, DELAYED RELEASE ORAL at 07:40

## 2021-05-23 RX ADMIN — FLUOXETINE 30 MG: 20 CAPSULE ORAL at 08:41

## 2021-05-23 RX ADMIN — HYDROXYZINE PAMOATE 25 MG: 25 CAPSULE ORAL at 21:11

## 2021-05-23 RX ADMIN — ZIPRASIDONE HYDROCHLORIDE 20 MG: 20 CAPSULE ORAL at 15:11

## 2021-05-23 RX ADMIN — LORATADINE 10 MG: 10 TABLET ORAL at 08:41

## 2021-05-23 RX ADMIN — Medication 1 CAPSULE: at 08:41

## 2021-05-23 RX ADMIN — ALUMINUM HYDROXIDE, MAGNESIUM HYDROXIDE, AND SIMETHICONE 30 ML: 200; 200; 20 SUSPENSION ORAL at 19:43

## 2021-05-23 RX ADMIN — MELATONIN TAB 3 MG 3 MG: 3 TAB at 21:10

## 2021-05-23 RX ADMIN — CLOZAPINE 200 MG: 100 TABLET ORAL at 21:10

## 2021-05-23 RX ADMIN — OLANZAPINE 10 MG: 5 TABLET, ORALLY DISINTEGRATING ORAL at 08:41

## 2021-05-23 RX ADMIN — HYDROXYZINE PAMOATE 25 MG: 25 CAPSULE ORAL at 08:41

## 2021-05-23 RX ADMIN — DOCUSATE SODIUM 100 MG: 100 CAPSULE, LIQUID FILLED ORAL at 08:41

## 2021-05-23 RX ADMIN — FLUTICASONE PROPIONATE 1 SPRAY: 50 SPRAY, METERED NASAL at 08:40

## 2021-05-23 RX ADMIN — FLUTICASONE PROPIONATE 1 PUFF: 110 AEROSOL, METERED RESPIRATORY (INHALATION) at 08:41

## 2021-05-23 RX ADMIN — OLANZAPINE 10 MG: 5 TABLET, ORALLY DISINTEGRATING ORAL at 21:10

## 2021-05-23 RX ADMIN — ACETAMINOPHEN 650 MG: 325 TABLET, FILM COATED ORAL at 15:11

## 2021-05-23 RX ADMIN — SERTRALINE HYDROCHLORIDE 50 MG: 50 TABLET ORAL at 08:41

## 2021-05-23 RX ADMIN — TRAZODONE HYDROCHLORIDE 50 MG: 50 TABLET ORAL at 21:10

## 2021-05-23 RX ADMIN — ASPIRIN 81 MG: 81 TABLET, CHEWABLE ORAL at 08:41

## 2021-05-23 NOTE — BH NOTES
Patient seen for follow-up chart review spoke with the nursing staff patient has hyperactive not as well as pacing still talking to himself laughing inappropriately not as loud not as intense not as frequent affect remains flat no aggressive behavior compliant with medications avoiding going to groups.   No fever no infection temperature 98.2 pulse 125 blood pressure 104 2/89 respiration 18 we will closely monitor his pulse no new labs resulted continued inpatient level of care indicated

## 2021-05-23 NOTE — GROUP NOTE
IP  GROUP DOCUMENTATION INDIVIDUAL Group Therapy Note Date: 5/23/2021 Group Start Time: 1300 Group End Time: 1914 Group Topic: Process Group - Inpatient SRM 2 BH NON ACUTE [place] IP  GROUP DOCUMENTATION GROUP Group Therapy Note This writer held a process group to encourage the patient to discuss their feelings, goals for treatment, and ways they can manage their mental health after discharge. This writer also discussed the stages of change model and the importance of applying coping mechanisms regularly. Attendees: 7 out of 19 Attendance: Did not attend Patient's Goal:  N/A Interventions/techniques: N/A Follows Directions: N/A Interactions: N/A Mental Status: N/A Behavior/appearance: N/A Goals Achieved: N/A Additional Notes:  Patient refused to attend group. Charleen Epley

## 2021-05-23 NOTE — BH NOTES
DAYSHIFT NOTE:     Patient is up and walking around the unit this morning. Patient continues to mumble/talk to himself at times throughout the day. Patient will laugh to himself and make strange noises at times. Patient states that his voices are better this morning. Patient stated to the writer that he always feels thirsty and feels dehydrated. Patient stated that his thyroid may be off. Patient has a good appetite and does ask for soda frequently. Patient has still talked to himself at times throughout the day, but is noticeably better and quieter. Patient will ask staff appropriate questions. Patient asks for towels to take a shower. Patient has not attended group. Patient asked the nurse for something for a headache and anxiety this afternoon and was given PRN tylenol and Geodon. Patient has not had any further complaints since receiving the PRN medications. Patient has been pleasant and not a behavior problem today. Close observations continued to ensure patient safety. Will continue to monitor.

## 2021-05-23 NOTE — BH NOTES
Pt presents manipulative, attempting to secure ginger ale outside designated snack times and then faking stomach pain, directed away from this bx to poor effect, presents psychotic, responding internally aeb gesticulation and conversations w/ self in both English and unidentifiable foreign language, minimally participatory in assessment, shift spent pacing unit and room  No bx issues noted, med compliant  Denies SI HI NSSI no ADRs noted  Continuing to monitor

## 2021-05-24 LAB
BASOPHILS # BLD: 0 K/UL (ref 0–0.1)
BASOPHILS NFR BLD: 0 % (ref 0–1)
DIFFERENTIAL METHOD BLD: NORMAL
EOSINOPHIL # BLD: 0.2 K/UL (ref 0–0.4)
EOSINOPHIL NFR BLD: 2 % (ref 0–7)
ERYTHROCYTE [DISTWIDTH] IN BLOOD BY AUTOMATED COUNT: 14.3 % (ref 11.5–14.5)
HCT VFR BLD AUTO: 42.5 % (ref 36.6–50.3)
HGB BLD-MCNC: 13.9 G/DL (ref 12.1–17)
IMM GRANULOCYTES # BLD AUTO: 0 K/UL (ref 0–0.04)
IMM GRANULOCYTES NFR BLD AUTO: 0 % (ref 0–0.5)
LYMPHOCYTES # BLD: 2.9 K/UL (ref 0.8–3.5)
LYMPHOCYTES NFR BLD: 30 % (ref 12–49)
MCH RBC QN AUTO: 28.4 PG (ref 26–34)
MCHC RBC AUTO-ENTMCNC: 32.7 G/DL (ref 30–36.5)
MCV RBC AUTO: 86.7 FL (ref 80–99)
MONOCYTES # BLD: 0.9 K/UL (ref 0–1)
MONOCYTES NFR BLD: 9 % (ref 5–13)
NEUTS SEG # BLD: 5.5 K/UL (ref 1.8–8)
NEUTS SEG NFR BLD: 59 % (ref 32–75)
NRBC # BLD: 0 K/UL (ref 0–0.01)
NRBC BLD-RTO: 0 PER 100 WBC
PLATELET # BLD AUTO: 333 K/UL (ref 150–400)
PMV BLD AUTO: 9.3 FL (ref 8.9–12.9)
RBC # BLD AUTO: 4.9 M/UL (ref 4.1–5.7)
WBC # BLD AUTO: 9.5 K/UL (ref 4.1–11.1)

## 2021-05-24 PROCEDURE — 85025 COMPLETE CBC W/AUTO DIFF WBC: CPT

## 2021-05-24 PROCEDURE — 65220000003 HC RM SEMIPRIVATE PSYCH

## 2021-05-24 PROCEDURE — 74011250637 HC RX REV CODE- 250/637: Performed by: PSYCHIATRY & NEUROLOGY

## 2021-05-24 PROCEDURE — 36415 COLL VENOUS BLD VENIPUNCTURE: CPT

## 2021-05-24 RX ADMIN — OLANZAPINE 10 MG: 5 TABLET, ORALLY DISINTEGRATING ORAL at 21:26

## 2021-05-24 RX ADMIN — HYDROXYZINE PAMOATE 25 MG: 25 CAPSULE ORAL at 21:26

## 2021-05-24 RX ADMIN — SERTRALINE HYDROCHLORIDE 50 MG: 50 TABLET ORAL at 08:42

## 2021-05-24 RX ADMIN — PANTOPRAZOLE SODIUM 40 MG: 40 TABLET, DELAYED RELEASE ORAL at 08:41

## 2021-05-24 RX ADMIN — FLUTICASONE PROPIONATE 1 PUFF: 110 AEROSOL, METERED RESPIRATORY (INHALATION) at 21:31

## 2021-05-24 RX ADMIN — FLUOXETINE 30 MG: 20 CAPSULE ORAL at 08:41

## 2021-05-24 RX ADMIN — ASPIRIN 81 MG: 81 TABLET, CHEWABLE ORAL at 08:41

## 2021-05-24 RX ADMIN — HYDROXYZINE PAMOATE 25 MG: 25 CAPSULE ORAL at 08:41

## 2021-05-24 RX ADMIN — Medication 1 CAPSULE: at 08:41

## 2021-05-24 RX ADMIN — CLOZAPINE 200 MG: 100 TABLET ORAL at 21:25

## 2021-05-24 RX ADMIN — OLANZAPINE 10 MG: 5 TABLET, ORALLY DISINTEGRATING ORAL at 08:42

## 2021-05-24 RX ADMIN — DOCUSATE SODIUM 100 MG: 100 CAPSULE, LIQUID FILLED ORAL at 08:41

## 2021-05-24 RX ADMIN — MELATONIN TAB 3 MG 3 MG: 3 TAB at 21:26

## 2021-05-24 RX ADMIN — LORATADINE 10 MG: 10 TABLET ORAL at 08:41

## 2021-05-24 NOTE — BH NOTES
Behavioral Health Treatment Team Note     Patient goal(s) for today: \"to get out of here soon\"  Treatment team focus/goals: medication management, group therapy, DC planning    Progress note: Pt presented w a flat affect and congruent mood. He reported feeling \"ok. \" He denied depression and rated his anxiety a 5/10. He reported being anxious about getting out and adjusting to living after here. He denied SI, HI, and AVH. The therapist will continue to work w  for med management and DC planning. LOS:  7  Expected LOS: TDO until the 28th    Insurance info/prescription coverage: [facility]  Date of last family contact: 5/22/21  Family requesting physician contact today: no  Discharge plan:  Pt will DC to [address] [place] [address] in the home:  no   Outpatient provider(s):   Will be determined prior to DC    Participating treatment team members: Toney De La Fuente, * (assigned SW), MARCELA Reyes

## 2021-05-24 NOTE — BH NOTES
RELEASE OF INFORMATION    The patient signed a consent form for his father Jeremiah Stewart ([phone]), and [facility] ([phone]).

## 2021-05-24 NOTE — GROUP NOTE
ANG  GROUP DOCUMENTATION INDIVIDUAL Group Therapy Note Date: 5/24/2021 Group Start Time: 5682 Group End Time: 1000 Group Topic: [employer] SRM BEHAVIORAL HLTH OP [place] FRY  GROUP DOCUMENTATION GROUP Group Therapy Note Attendees:  
 
  
 
Attendance: Did not attend Patient's Goal: Interventions/techniques: Follows Directions:  
 
Interactions:  
 
Mental Status:  
 
Behavior/appearance:  
 
Goals Achieved:   
 
 
Additional Notes:   
 
Jerod Michaels

## 2021-05-24 NOTE — GROUP NOTE
ANG  GROUP DOCUMENTATION INDIVIDUAL Group Therapy Note Date: 5/24/2021 Group Start Time: 2469 Group End Time: 2374 Group Topic: Nursing SRM 2 BEHA HLTH ACUTE [place]  GROUP DOCUMENTATION GROUP Group Therapy Note Attendees: 
  
 
Attendance: Did not attend Patient's Goal: Interventions/techniques: Follows Directions:  
 
Interactions:  
 
Mental Status:  
 
Behavior/appearance:  
 
Goals Achieved:   
 
 
Additional Notes:   
 
Yaya Ogden LPN

## 2021-05-24 NOTE — BH NOTES
Pt presents calm, pleasant, cooperative, actively responding internally to internal stimuli, carrying on conversation w/ self in at least distinctly identifiable languages, content unknown, presents calm in said conversations, shift spent pacing in room and in halls, pleasant and cooperative on approach, endorses continuing hallucinations, denies command quality  No bx issues noted, med compliant  Denies SI HI NSSI denies sleep med appetite problems  Continuing to monitor

## 2021-05-24 NOTE — GROUP NOTE
IP  GROUP DOCUMENTATION INDIVIDUAL Group Therapy Note Date: 5/24/2021 Group Start Time: 1500 Group End Time: 8520 Group Topic: Process Group - Inpatient SRM 2 BH NON ACUTE Qian Kinney [facility] GROUP DOCUMENTATION GROUP Group Therapy Note Attendees: 4 Pt's were encouraged to participate in process group by expressing their thoughts and feelings in an appropriate manner while providing encouragement and support to their peers. During this session topics that were discussed were: reasons for hospitalization, coping skills, and plans for change. Attendance: Did not attend Patient's Goal:  NA Interventions/techniques:NA Follows Directions:NA Interactions: NA Mental Status: NA Behavior/appearance:NA Goals Achieved:NA Additional Notes:  Pt did not attend this session despite being invited.   
 
Yessy Fitzpatrick, [id number] [facility], [address] [place] [address]

## 2021-05-24 NOTE — BH NOTES
Pt.is up and visible on unit,appears to be responding to internal stimili., pacing,talking to self in different language, laughing inappropriately and smiling to self, remains pleasant upon approach, pt. Is accepting medications as ordered. No interaction with peers, pt.not attending groups, limited insight and judgement.

## 2021-05-25 PROCEDURE — 74011250637 HC RX REV CODE- 250/637: Performed by: PSYCHIATRY & NEUROLOGY

## 2021-05-25 PROCEDURE — 65220000003 HC RM SEMIPRIVATE PSYCH

## 2021-05-25 RX ADMIN — CLOZAPINE 200 MG: 100 TABLET ORAL at 21:13

## 2021-05-25 RX ADMIN — FLUTICASONE PROPIONATE 1 PUFF: 110 AEROSOL, METERED RESPIRATORY (INHALATION) at 21:30

## 2021-05-25 RX ADMIN — FLUOXETINE 30 MG: 20 CAPSULE ORAL at 09:00

## 2021-05-25 RX ADMIN — HYDROXYZINE HYDROCHLORIDE 50 MG: 50 TABLET, FILM COATED ORAL at 18:37

## 2021-05-25 RX ADMIN — OLANZAPINE 10 MG: 5 TABLET, ORALLY DISINTEGRATING ORAL at 21:13

## 2021-05-25 RX ADMIN — ASPIRIN 81 MG: 81 TABLET, CHEWABLE ORAL at 09:00

## 2021-05-25 RX ADMIN — HYDROXYZINE PAMOATE 25 MG: 25 CAPSULE ORAL at 09:00

## 2021-05-25 RX ADMIN — HYDROXYZINE PAMOATE 25 MG: 25 CAPSULE ORAL at 21:13

## 2021-05-25 RX ADMIN — Medication 1 CAPSULE: at 09:00

## 2021-05-25 RX ADMIN — ALUMINUM HYDROXIDE, MAGNESIUM HYDROXIDE, AND SIMETHICONE 30 ML: 200; 200; 20 SUSPENSION ORAL at 18:37

## 2021-05-25 RX ADMIN — ACETAMINOPHEN 650 MG: 325 TABLET, FILM COATED ORAL at 18:37

## 2021-05-25 RX ADMIN — SERTRALINE HYDROCHLORIDE 50 MG: 50 TABLET ORAL at 09:00

## 2021-05-25 RX ADMIN — MELATONIN TAB 3 MG 3 MG: 3 TAB at 21:13

## 2021-05-25 RX ADMIN — LORATADINE 10 MG: 10 TABLET ORAL at 09:00

## 2021-05-25 RX ADMIN — OLANZAPINE 10 MG: 5 TABLET, ORALLY DISINTEGRATING ORAL at 09:00

## 2021-05-25 RX ADMIN — DOCUSATE SODIUM 100 MG: 100 CAPSULE, LIQUID FILLED ORAL at 09:00

## 2021-05-25 NOTE — BH NOTES
COLLATERAL CONTACT    The therapist contacted a psychiatrist named Mahnaz Cooper who is working with the patient at [phone]. Parker Norman said that he is a NP located in [place] although he visits the patient at the group home he lives in which is [address] [place] [address]. He said that despite the name it is a group home. He said that he believes they are located in [place] [address], and provided this therapist with a staff member's contact information. He gave [facility] [place] number which is [phone].

## 2021-05-25 NOTE — GROUP NOTE
ANG  GROUP DOCUMENTATION INDIVIDUAL Group Therapy Note Date: 5/25/2021 Group Start Time: 1100 Group End Time: 3223 Group Topic: Education Group - Inpatient SRM 2 BEHA HLTH ACUTE Pablo FRY  GROUP DOCUMENTATION GROUP Group Therapy Note Attendees: 6/12 Psych Ed: Pts were asked to share a positive coping skill as a check in question. Pts were then walked through protective factors and encouraged to shre what they identified as their biggest support systems, coping skills and how they manage emotions. Pts were then walked through a breathing exercise and asked to reflect on group Attendance: Did not attend Additional Notes:  Pt was encouraged to attend but chose not to do so [facility]

## 2021-05-25 NOTE — GROUP NOTE
[facility] GROUP DOCUMENTATION INDIVIDUAL Group Therapy Note Date: 5/25/2021 Group Start Time: 1300 Group End Time: 4301 Group Topic: Process Group - Inpatient SRM 2 BEHA HLTH ACUTE June Prost [facility] GROUP DOCUMENTATION GROUP Group Therapy Note Attendees: 3/12 Process: Pts were asked how they were feeling as a check in question. Pts were then asked to share a date that meant something to them and this writer would read aloud the quote in accordance with that date. Pts and writer would then reflect on what the quote meant and writer encouraged pts to share feedback. Pts were encouraged to reflect on group. Attendance: Did not attend Additional Notes:  Pt was encouraged to attend but chose not to do so [facility]

## 2021-05-25 NOTE — BH NOTES
Patient has been visible on the unit, walking in the hallway, talking & laughing to his self. He communicate by gesture. He cooperative and please. He compliant with medication. No acute distress noted. He remains on close observation for safety.  No depression or SI.

## 2021-05-25 NOTE — BH NOTES
COLLATERAL CONTACT    The therapist spoke with the patient's father over the phone. He was not aware that the patient was hospitalized. He said that the last time he spoke to the patient he sounded very sick. He confirmed that he has been diagnosed with Schizophrenia and hallucinates when having an episode. The therapist inquired about injections he has been on in the past. The dad said that he has been on injections in the past that he has taken once a month, and some that he takes every 6 weeks, although could not recall the name of the medications. He said that at his baseline the patient wants to recover, wants to find a job, and is independent. 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 He said that when the patient first became sick he had made several suicide attempts by trying to cut himself with a knife. He said that the patient normally calls him on the phone from the hospital when he is feeling better. The therapist said that she would encourage the patient to call him, and provided him an update in regards to his progress that he has made. The therapist also attempted to contact [facility] to reach his , although was placed on hold for a long time and was unable to speak with anybody.

## 2021-05-25 NOTE — BH NOTES
Behavioral Health Treatment Team Note     Patient goal(s) for today: To read a book  Treatment team focus/goals: To continue group therapy and medication management. Progress note: The patient was presenting with a broad affect and congruent mood. His thoughts and speech were organized, although delusional at times. He reported that he has his masters in psychology from [employer]. He also said that he studied art there as well. He denied any SI/HI. He also denied AH/VH's although appeared to be responding to internal stimuli at times. The therapist inquired more about his living arrangement and he said that it was a motel, although it is documented that it is a group home. He feels like it is demonic there and that the hospital is a safe place. He had requested to read a book. The therapist had gotten him one on positive thinking, which he was very appreciative of. An inpatient level of care is still necessary due to the patient's continued psychotic symptoms. LOS:  8  Expected LOS: Committed until 5/28/21    Insurance info/prescription coverage:  [facility]  Date of last family contact:  5/25/21  Family requesting physician contact today:  no  Discharge plan: The therapist will continue to explore options with the patient.    Guns in the home:  no   Outpatient provider(s): [facility]     Participating treatment team members: Meghana Dodd, Geno Dunaway LMSW

## 2021-05-25 NOTE — BH NOTES
Pt up and out of room on time for breakfast.  Medication compliant and no negative effects from medications observed or reported. Pt observed thought blocking, halting speech, tangential, affect smiling, inappropriate responses in context at times to questions. Pt is pleasant on approach. Denies suicidal ideation and depression. Continue 15 minute checks to maintain pt safety.

## 2021-05-26 PROCEDURE — 65220000003 HC RM SEMIPRIVATE PSYCH

## 2021-05-26 PROCEDURE — 85025 COMPLETE CBC W/AUTO DIFF WBC: CPT

## 2021-05-26 PROCEDURE — 74011250637 HC RX REV CODE- 250/637: Performed by: PSYCHIATRY & NEUROLOGY

## 2021-05-26 PROCEDURE — 36415 COLL VENOUS BLD VENIPUNCTURE: CPT

## 2021-05-26 PROCEDURE — 84443 ASSAY THYROID STIM HORMONE: CPT

## 2021-05-26 RX ADMIN — DOCUSATE SODIUM 100 MG: 100 CAPSULE, LIQUID FILLED ORAL at 08:20

## 2021-05-26 RX ADMIN — PANTOPRAZOLE SODIUM 40 MG: 40 TABLET, DELAYED RELEASE ORAL at 08:19

## 2021-05-26 RX ADMIN — HYDROXYZINE PAMOATE 25 MG: 25 CAPSULE ORAL at 08:20

## 2021-05-26 RX ADMIN — FLUTICASONE PROPIONATE 1 PUFF: 110 AEROSOL, METERED RESPIRATORY (INHALATION) at 08:21

## 2021-05-26 RX ADMIN — Medication 1 CAPSULE: at 08:19

## 2021-05-26 RX ADMIN — HYDROXYZINE PAMOATE 25 MG: 25 CAPSULE ORAL at 20:13

## 2021-05-26 RX ADMIN — SERTRALINE HYDROCHLORIDE 50 MG: 50 TABLET ORAL at 08:20

## 2021-05-26 RX ADMIN — ASPIRIN 81 MG: 81 TABLET, CHEWABLE ORAL at 08:20

## 2021-05-26 RX ADMIN — TRAZODONE HYDROCHLORIDE 50 MG: 50 TABLET ORAL at 21:13

## 2021-05-26 RX ADMIN — MELATONIN TAB 3 MG 3 MG: 3 TAB at 21:09

## 2021-05-26 RX ADMIN — OLANZAPINE 10 MG: 5 TABLET, ORALLY DISINTEGRATING ORAL at 08:20

## 2021-05-26 RX ADMIN — CLOZAPINE 200 MG: 100 TABLET ORAL at 21:09

## 2021-05-26 RX ADMIN — FLUTICASONE PROPIONATE 1 PUFF: 110 AEROSOL, METERED RESPIRATORY (INHALATION) at 20:13

## 2021-05-26 RX ADMIN — LORATADINE 10 MG: 10 TABLET ORAL at 08:20

## 2021-05-26 RX ADMIN — FLUTICASONE PROPIONATE 1 SPRAY: 50 SPRAY, METERED NASAL at 08:20

## 2021-05-26 RX ADMIN — MAGNESIUM HYDROXIDE 30 ML: 400 SUSPENSION ORAL at 21:13

## 2021-05-26 RX ADMIN — OLANZAPINE 10 MG: 5 TABLET, ORALLY DISINTEGRATING ORAL at 20:13

## 2021-05-26 RX ADMIN — FLUOXETINE 30 MG: 20 CAPSULE ORAL at 08:19

## 2021-05-26 NOTE — BH NOTES
Patient seen for follow-up he is calmer but still talking to himself loud poor insight poor judgment compliant with medication staff trying to reach out to the brother also find out any if any taking long-acting injection.   Is is compliant with medication his vital signs today temperature 98.4 pulse 79 blood pressure 149/58 respiration 18 continued inpatient level of care indicated his labs CBC and a CMP unremarkable continued inpatient level of care indicated

## 2021-05-26 NOTE — BH NOTES
Behavioral Health Treatment Team Note     Patient goal(s) for today: To call his father again. Treatment team focus/goals: To continue group therapy and medication management. Progress note: The patient was presenting with a mostly flat affect and congruent mood. His appearance was disheveled as well, as evidenced by his unkempt hair and facial hair. He denied SI/HI and AH/Vh's. The writer noticed that he was still responding to internal stimuli and was speaking to himself. When talking to himself he would mumble and it was more quiet. He also presented delusional as well, and spoke about having 30% of a stock in the stock market. He said that he spoke with his father yesterday. An inpatient level of care is necessary due to his continued psychotic symptoms, including delusions, and responding to internal stimuli. LOS:  9  Expected LOS: committed up to 5/28/21    Insurance info/prescription coverage:  [facility] Medicaid   Date of last family contact:  5/25/21  Family requesting physician contact today:  no  Discharge plan: The therapist will continue to explore options with the patient.    Guns in the home:  no   Outpatient provider(s):  [facility]     Participating treatment team members: Amelia Dyson, JUAN

## 2021-05-26 NOTE — GROUP NOTE
ANG  GROUP DOCUMENTATION INDIVIDUAL Group Therapy Note Date: 5/26/2021 Group Start Time: 1100 Group End Time: 0356 Group Topic: Education Group - Inpatient SRM 2 BEHA HLTH ACUTE Ketan Geo ANG  GROUP DOCUMENTATION GROUP Group Therapy Note Attendees: 3/12 Safety Planning: Pts were asked to share what they want out of life as a check in question. Pts were then walked through the safety planning worksheet. Pts shared warning signs and coping skills. Pts were then asked to reflect on group Attendance: Did not attend Additional Notes:  Pt was encouraged to attend but chose not to do so [facility]

## 2021-05-26 NOTE — PROGRESS NOTES
Progress Note  Date:2021       Room:[facility]  Patient Name:Jon Restrepo     YOB: 1995     Age:25 y.o. Subjective    Subjective   Review of Systems  Objective         Vitals Last 24 Hours:  TEMPERATURE:  Temp  Av.3 °F (36.8 °C)  Min: 97.9 °F (36.6 °C)  Max: 98.6 °F (37 °C)  RESPIRATIONS RANGE: Resp  Av  Min: 18  Max: 18  PULSE OXIMETRY RANGE: No data recorded  PULSE RANGE: Pulse  Av  Min: 116  Max: 118  BLOOD PRESSURE RANGE: Systolic (55CTT), DHJ:462 , Min:131 , AQF:548   ; Diastolic (06TGO), KCT:31, Min:77, Max:85    I/O (24Hr): No intake or output data in the 24 hours ending 21 1502  Objective  Labs/Imaging/Diagnostics    Labs:  CBC:  Recent Labs     21  0555   WBC 9.5   RBC 4.90   HGB 13.9   HCT 42.5   MCV 86.7   RDW 14.3        CHEMISTRIES:No results for input(s): NA, K, CL, CO2, BUN, CA, PHOS, MG in the last 72 hours. No lab exists for component: CREATININE, GLUCOSEPT/INR:No results for input(s): INR, INREXT in the last 72 hours. No lab exists for component: PROTIME  APTT:No results for input(s): APTT in the last 72 hours. LIVER PROFILE:No results for input(s): AST, ALT in the last 72 hours. No lab exists for component: Diania Felix, ALKPHOS  Lab Results   Component Value Date/Time    ALT (SGPT) 47 2021 06:18 AM    AST (SGOT) 26 2021 06:18 AM    Alk. phosphatase 95 2021 06:18 AM    Bilirubin, total 0.3 2021 06:18 AM       Imaging Last 24 Hours:  No results found.   Assessment//Plan   Principal Problem:    Schizophrenia ([address] (2021)      Assessment & Plan    Electronically signed by Lizette Petersen MD on 2021 at 3:02 PM

## 2021-05-26 NOTE — BH NOTES
Patient up walking laps around the unit, carrying on a conversation with himself. Medication compliant. No side effects noted. Stated that he speaks 10 languages. Reports living in [facility] from the age of 3 months until he was 7years old. Dressed in hospital gowns. Disheveled appearance. Hair uncombed. Appetite good, eating 100% of his meals. Denies SI/HI. No physical complaints voiced. Remains on CO. Continue to monitor. Presently walking, laughing out loud & talking to self.

## 2021-05-26 NOTE — BH NOTES
Patient case discussed in the treatment team  brought the information able to talk to the father patient has history of severe mental illness but when he is doing well that he can function on work. 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 No agitation no aggression probably has taken long-acting injection in the past I talked him about it he says he do not like the long-acting injection he want to stick with his psychiatric medications.   He is currently taking Clazuril 200 mg / 30 mg hydroxyzine as needed melatonin olanzapine 10 mg daily sertraline 50 mg daily we will increase his will give you 50 mg Clozaril in the morning in addition to regular his insight is poor judgment is full eats well sleeps well vital signs today temperature 97.9 pulse 118 blood pressure 131/77 respiration 18 labs reviewed no new labs resulted and his WBC CBC was unremarkable on 8/19/2021 I will go ahead and order new CBC and TSH is obese looks myxedematous continued inpatient level of care indicated patient remains still delusional hallucinating poor insight poor judgment continued inpatient

## 2021-05-26 NOTE — BH NOTES
Patient is pleasant and cooperative. Pt continues to hear voices as he talks to self continuously and is obviously responding to internal stimuli. Pt was med compliant, ate snack before bed as he ambulated the halls for several hours of the evening before finally going to bed. Pt has been resting well tonight with no signs of distress noted, respirations regular and unlabored. Will continue to monitor patient closely every 15 mins as per unit protocol.

## 2021-05-27 LAB
BASOPHILS # BLD: 0 K/UL (ref 0–0.1)
BASOPHILS NFR BLD: 0 % (ref 0–1)
DIFFERENTIAL METHOD BLD: NORMAL
EOSINOPHIL # BLD: 0.1 K/UL (ref 0–0.4)
EOSINOPHIL NFR BLD: 1 % (ref 0–7)
ERYTHROCYTE [DISTWIDTH] IN BLOOD BY AUTOMATED COUNT: 14.3 % (ref 11.5–14.5)
HCT VFR BLD AUTO: 44.1 % (ref 36.6–50.3)
HGB BLD-MCNC: 14.3 G/DL (ref 12.1–17)
IMM GRANULOCYTES # BLD AUTO: 0 K/UL (ref 0–0.04)
IMM GRANULOCYTES NFR BLD AUTO: 0 % (ref 0–0.5)
LYMPHOCYTES # BLD: 3.3 K/UL (ref 0.8–3.5)
LYMPHOCYTES NFR BLD: 34 % (ref 12–49)
MCH RBC QN AUTO: 28.3 PG (ref 26–34)
MCHC RBC AUTO-ENTMCNC: 32.4 G/DL (ref 30–36.5)
MCV RBC AUTO: 87.3 FL (ref 80–99)
MONOCYTES # BLD: 0.7 K/UL (ref 0–1)
MONOCYTES NFR BLD: 7 % (ref 5–13)
NEUTS SEG # BLD: 5.7 K/UL (ref 1.8–8)
NEUTS SEG NFR BLD: 58 % (ref 32–75)
NRBC # BLD: 0 K/UL (ref 0–0.01)
NRBC BLD-RTO: 0 PER 100 WBC
PLATELET # BLD AUTO: 312 K/UL (ref 150–400)
PMV BLD AUTO: 9.7 FL (ref 8.9–12.9)
RBC # BLD AUTO: 5.05 M/UL (ref 4.1–5.7)
TSH SERPL DL<=0.05 MIU/L-ACNC: 2.92 UIU/ML (ref 0.36–3.74)
WBC # BLD AUTO: 9.8 K/UL (ref 4.1–11.1)

## 2021-05-27 PROCEDURE — 74011250637 HC RX REV CODE- 250/637: Performed by: PSYCHIATRY & NEUROLOGY

## 2021-05-27 PROCEDURE — 65220000003 HC RM SEMIPRIVATE PSYCH

## 2021-05-27 RX ORDER — CLOZAPINE 25 MG/1
50 TABLET ORAL DAILY
Status: DISCONTINUED | OUTPATIENT
Start: 2021-05-28 | End: 2021-06-08 | Stop reason: HOSPADM

## 2021-05-27 RX ADMIN — SERTRALINE HYDROCHLORIDE 50 MG: 50 TABLET ORAL at 08:32

## 2021-05-27 RX ADMIN — LORATADINE 10 MG: 10 TABLET ORAL at 08:32

## 2021-05-27 RX ADMIN — DOCUSATE SODIUM 100 MG: 100 CAPSULE, LIQUID FILLED ORAL at 08:30

## 2021-05-27 RX ADMIN — FLUTICASONE PROPIONATE 1 PUFF: 110 AEROSOL, METERED RESPIRATORY (INHALATION) at 08:32

## 2021-05-27 RX ADMIN — FLUTICASONE PROPIONATE 1 SPRAY: 50 SPRAY, METERED NASAL at 08:32

## 2021-05-27 RX ADMIN — CLOZAPINE 200 MG: 100 TABLET ORAL at 21:01

## 2021-05-27 RX ADMIN — Medication 1 CAPSULE: at 08:32

## 2021-05-27 RX ADMIN — HYDROXYZINE PAMOATE 25 MG: 25 CAPSULE ORAL at 08:31

## 2021-05-27 RX ADMIN — OLANZAPINE 10 MG: 5 TABLET, ORALLY DISINTEGRATING ORAL at 21:00

## 2021-05-27 RX ADMIN — FLUTICASONE PROPIONATE 1 PUFF: 110 AEROSOL, METERED RESPIRATORY (INHALATION) at 21:01

## 2021-05-27 RX ADMIN — OLANZAPINE 10 MG: 5 TABLET, ORALLY DISINTEGRATING ORAL at 08:32

## 2021-05-27 RX ADMIN — PANTOPRAZOLE SODIUM 40 MG: 40 TABLET, DELAYED RELEASE ORAL at 06:30

## 2021-05-27 RX ADMIN — MELATONIN TAB 3 MG 3 MG: 3 TAB at 21:01

## 2021-05-27 RX ADMIN — FLUOXETINE 30 MG: 20 CAPSULE ORAL at 08:30

## 2021-05-27 RX ADMIN — HYDROXYZINE PAMOATE 25 MG: 25 CAPSULE ORAL at 21:01

## 2021-05-27 RX ADMIN — ASPIRIN 81 MG: 81 TABLET, CHEWABLE ORAL at 08:32

## 2021-05-27 NOTE — GROUP NOTE
[facility] GROUP DOCUMENTATION INDIVIDUAL Group Therapy Note Date: 5/27/2021 Group Start Time: 1000 Group End Time: 0763 Group Topic: Nursing SRM 2 BEHA [facility] ACUTE Ibeth Royal RN 
 
[facility] GROUP DOCUMENTATION GROUP Group Therapy Note Attendees:  
 
  
 
Attendance: Did not attend Patient's Goal: Interventions/techniques: Follows Directions:  
 
Interactions:  
 
Mental Status:  
 
Behavior/appearance:  
 
Goals Achieved:   
 
 
Additional Notes:   
 
Omer Collins RN

## 2021-05-27 NOTE — PROGRESS NOTES
Presents alert and oriented x3 with improving insight. Flat, smiling and will converse when approached by staff. Visible in milieu, talking to self but not as much as previous day and not pacing unit. Inquired about \"starting multivitamins to improve health\". Accepted all medications.

## 2021-05-27 NOTE — BH NOTES
Patient up on the unit, walking laps around the unit & carrying on a conversation with himself. During conversation, patient talks in different languages & laughs out loud at times. Medication compliant. No side effects noted. Denies SI/HI. Not attending groups. No physical complaints voiced. Remains walking laps around the unit. Remains on CO. Continue to monitor.

## 2021-05-27 NOTE — PROGRESS NOTES
Problem: Falls - Risk of  Goal: *Absence of Falls  Description: Document Jacklyn Casper Fall Risk and appropriate interventions in the flowsheet.   Outcome: Progressing Towards Goal  Note: Fall Risk Interventions:            Medication Interventions: Teach patient to arise slowly                   Problem: Psychosis  Goal: *STG: Decreased delusional thinking  Outcome: Progressing Towards Goal  Goal: *STG: Remains safe in hospital  Outcome: Progressing Towards Goal

## 2021-05-27 NOTE — BH NOTES
Behavioral Health Treatment Team Note     Patient goal(s) for today: 'go home'  Treatment team focus/goals: continue medication management, group therapy and coordinate discharge    Progress note: Pt presented with a flat affect and depressed mood. Pt appeared to be thought blocking as there were pauses between his responses. Pt then discussed how he knows 10 languages and listed them to this writer. The pt spoke about how his father is Ryan and his mother was 'born in [place] but is from [place]'. Pt was tangential and had trouble focusing. Pt denied SI/HI/AVH and depression and anxiety at this time. When writer left the pt, the pt began speaking to himself. The pt still needs an inpatient level of care due to lack of insight into mental health. LOS:  10  Expected LOS: 10-12 days    Insurance info/prescription coverage:  [facility] Medicaid  Date of last family contact:  8.23.25  Family requesting physician contact today:  no  Discharge plan:  Therapist will continue to explore options.  Pt said he would like to live with his father  Guns in the home:  no   Outpatient provider(s):  [facility]    Participating treatment team members: Jenn Olivares, * (assigned SW), MARCELA Sotelo

## 2021-05-27 NOTE — GROUP NOTE
[facility] GROUP DOCUMENTATION INDIVIDUAL Group Therapy Note Date: 5/27/2021 Group Start Time: 1100 Group End Time: 1089 Group Topic: Process Group - Inpatient SRM 2 BH NON ACUTE Coy Ulysses [facility] GROUP DOCUMENTATION GROUP Group Therapy Note Attendees: 3 Pt's were encouraged to participate in process group to talk about their thoughts and feelings and learn how to process appropriately. Topics discuss in group today are: daily goals, coping skills and self esteem. Pt's were encouraged to provide support to each other. Attendance: Did not attend Patient's Goal:  NA Interventions/techniques: NA Follows Directions: NA Interactions: NA Mental Status: NA Behavior/appearance: NA 
 
Goals Achieved: NA Additional Notes:  Pt did not attend this group despite being invited.   
 
Chantel Solano, [address] [facility], [address] [place] [address]

## 2021-05-27 NOTE — GROUP NOTE
IP  GROUP DOCUMENTATION INDIVIDUAL Group Therapy Note Date: 5/27/2021 Group Start Time: 8853 Group End Time: 5481 Group Topic: [employer] SRM BEHAVIORAL HLTH OP [place] IP  GROUP DOCUMENTATION GROUP Group Therapy Note Attendees:  
 
  
 
Attendance: Did not attend Patient's Goal: Interventions/techniques: Follows Directions:  
 
Interactions:  
 
Mental Status:  
 
Behavior/appearance:  
 
Goals Achieved:   
 
 
Additional Notes:   
 
Marisela Ortega

## 2021-05-28 LAB
ATRIAL RATE: 106 BPM
CALCULATED P AXIS, ECG09: 55 DEGREES
CALCULATED R AXIS, ECG10: 46 DEGREES
CALCULATED T AXIS, ECG11: 11 DEGREES
DIAGNOSIS, 93000: NORMAL
P-R INTERVAL, ECG05: 196 MS
Q-T INTERVAL, ECG07: 338 MS
QRS DURATION, ECG06: 86 MS
QTC CALCULATION (BEZET), ECG08: 448 MS
VENTRICULAR RATE, ECG03: 106 BPM

## 2021-05-28 PROCEDURE — 74011250637 HC RX REV CODE- 250/637: Performed by: PSYCHIATRY & NEUROLOGY

## 2021-05-28 PROCEDURE — 65220000003 HC RM SEMIPRIVATE PSYCH

## 2021-05-28 PROCEDURE — 93005 ELECTROCARDIOGRAM TRACING: CPT

## 2021-05-28 RX ADMIN — OLANZAPINE 10 MG: 5 TABLET, ORALLY DISINTEGRATING ORAL at 09:54

## 2021-05-28 RX ADMIN — CLOZAPINE 200 MG: 100 TABLET ORAL at 21:21

## 2021-05-28 RX ADMIN — CLOZAPINE 50 MG: 25 TABLET ORAL at 09:54

## 2021-05-28 RX ADMIN — SERTRALINE HYDROCHLORIDE 50 MG: 50 TABLET ORAL at 09:54

## 2021-05-28 RX ADMIN — MELATONIN TAB 3 MG 3 MG: 3 TAB at 21:22

## 2021-05-28 RX ADMIN — LORATADINE 10 MG: 10 TABLET ORAL at 09:54

## 2021-05-28 RX ADMIN — FLUOXETINE 30 MG: 20 CAPSULE ORAL at 09:54

## 2021-05-28 RX ADMIN — ACETAMINOPHEN 650 MG: 325 TABLET, FILM COATED ORAL at 16:50

## 2021-05-28 RX ADMIN — FLUTICASONE PROPIONATE 1 PUFF: 110 AEROSOL, METERED RESPIRATORY (INHALATION) at 21:22

## 2021-05-28 RX ADMIN — FLUTICASONE PROPIONATE 1 SPRAY: 50 SPRAY, METERED NASAL at 10:00

## 2021-05-28 RX ADMIN — Medication 1 CAPSULE: at 09:54

## 2021-05-28 RX ADMIN — PANTOPRAZOLE SODIUM 40 MG: 40 TABLET, DELAYED RELEASE ORAL at 08:02

## 2021-05-28 RX ADMIN — FLUTICASONE PROPIONATE 1 PUFF: 110 AEROSOL, METERED RESPIRATORY (INHALATION) at 08:00

## 2021-05-28 RX ADMIN — OLANZAPINE 10 MG: 5 TABLET, ORALLY DISINTEGRATING ORAL at 21:22

## 2021-05-28 RX ADMIN — ASPIRIN 81 MG: 81 TABLET, CHEWABLE ORAL at 09:54

## 2021-05-28 RX ADMIN — DOCUSATE SODIUM 100 MG: 100 CAPSULE, LIQUID FILLED ORAL at 09:54

## 2021-05-28 NOTE — BH NOTES
Patient is calm, cooperative, pleasant. He gives his depression a 0/10, anxiety 0/10 and denies SI, HI and hallucinations. He is med compliant, isolative. Pt has been sleeping well all night, no distress noted. Will continue to monitor patient every 15 mins as per unit protocol.

## 2021-05-28 NOTE — PROGRESS NOTES
Problem: Falls - Risk of  Goal: *Absence of Falls  Description: Document Aria Hugo Fall Risk and appropriate interventions in the flowsheet.   Outcome: Progressing Towards Goal  Note: Fall Risk Interventions:            Medication Interventions: Teach patient to arise slowly                   Problem: Psychosis  Goal: *STG: Decreased hallucinations  Outcome: Progressing Towards Goal  Goal: *STG: Decreased delusional thinking  Outcome: Progressing Towards Goal  Goal: *STG: Remains safe in hospital  Outcome: Progressing Towards Goal  Goal: *STG/LTG: Complies with medication therapy  Outcome: Progressing Towards Goal

## 2021-05-28 NOTE — BH NOTES
Patient has been walking about on the unit. He has been mumbling @ frequents. He has denied any feeling of depression. He gave two thumbs up & stated\" I am happy. \" He denies any anxiety, SI, HI Hallucinations or delusions. He has been over hear talking very loud while in the bathroom with the shower on. He has walked around the unit with a female peer. He has been medication compliant. He has remain safe. He remains on close observation.

## 2021-05-28 NOTE — BH NOTES
Behavioral Health Treatment Team Note     Patient goal(s) for today: To go to groups  Treatment team focus/goals: To continue group therapy and medication management. Progress note: The patient was presenting as disheveled, as evidenced by unkempt hair and facial hair. The patient was heard speaking to himself before the therapist entered his room. He denied SI/HI and AH/VH's. The therapist told him that she spoke with his father, who encouraged him to go back to his group home once he leaves here. The patient smiled at this information. The therapist encouraged him to call his father again, since he has not done that since being here. As the therapist was leaving she could hear him singing and laughing to himself. An inpatient level of care is still necessary in order to stabilize the patient more on medications due to his continued psychotic symptoms. LOS:  11  Expected LOS: Recommitted up to 6/11/21    Insurance info/prescription coverage:  [address]   Date of last family contact:  5/28/21  Family requesting physician contact today:  no  Discharge plan:  The patient will be discharged back to his group home when appropriate. He sees SIMI Farrell for medications. Guns in the home:  no   Outpatient provider(s):   Marcia Farrell    Participating treatment team members: Samara Malhotra, JUAN

## 2021-05-28 NOTE — GROUP NOTE
ANG  GROUP DOCUMENTATION INDIVIDUAL Group Therapy Note Date: 5/28/2021 Group Start Time: 1100 Group End Time: 1623 Group Topic: Process Group - Inpatient [facility] ACUTE Fan FRY  GROUP DOCUMENTATION GROUP Group Therapy Note Attendees: Attendees: All pts were encouraged to attend however only 6/11 attended. The topic was process and we used the positivity ball to focus on positive attitudes and thoughts. The pts passed the ball around a took turns w questions and statements. In the end they reflected on how they felt about group and if it was helpful. Attendance: Did not attend Julian Havers

## 2021-05-28 NOTE — BH NOTES
COLLATERAL CONTACT    The therapist contacted [facility] and spoke with a  there who looked the patient up in their system. She said that the patient does not receive services with them. The therapist also spoke with the patient's father as well in regards to the patient's plans of staying with him once he is discharged. The father said that the patient has not even called him since being hospitalized here. He also said that he will not allow the patient to stay with him, and that he needs to return to his group home once he is discharged. He was open to having a family session on Monday over the phone.

## 2021-05-28 NOTE — BH NOTES
TDO Disposition     Pt was represented by Mr Queen Joseph and seen by Sharrie Mohs. Pt was committed for 15 days for further inpatient treatment.  TDO paperwork is in chart

## 2021-05-28 NOTE — BH NOTES
Patient case discussed in the treatment team patient seen is still responded to inner stimuli talking to himself and this imaginary voices conversation not upset not agitated when he asked about that he acknowledges that that he says talking to people says he has been doing them since he was 26years old.   Thank he is somewhat receptive to long-acting medication we will also do a basic EKG on him for any QT prolongation current EKG status at present he is asymptomatic no shortness of breath no chest pain his vital signs today temperature 97.8 pulse 114 blood pressure 130/74 respiration 19 SPO2 100 on room air today CBC unremarkable making progress with continued inpatient level of care indicated no agitation no aggression no suicidal ideation or homicidal ideation check with the group home about possibility of returning home

## 2021-05-28 NOTE — BH NOTES
Pt. Encouraged to attend group but did not attend.   Psycho-ED/Characteristics of healthy relationships

## 2021-05-29 PROCEDURE — 65220000003 HC RM SEMIPRIVATE PSYCH

## 2021-05-29 PROCEDURE — 74011250637 HC RX REV CODE- 250/637: Performed by: PSYCHIATRY & NEUROLOGY

## 2021-05-29 RX ADMIN — ACETAMINOPHEN 650 MG: 325 TABLET, FILM COATED ORAL at 17:33

## 2021-05-29 RX ADMIN — ASPIRIN 81 MG: 81 TABLET, CHEWABLE ORAL at 09:26

## 2021-05-29 RX ADMIN — HYDROXYZINE HYDROCHLORIDE 50 MG: 50 TABLET, FILM COATED ORAL at 17:33

## 2021-05-29 RX ADMIN — CLOZAPINE 50 MG: 25 TABLET ORAL at 09:26

## 2021-05-29 RX ADMIN — DOCUSATE SODIUM 100 MG: 100 CAPSULE, LIQUID FILLED ORAL at 09:26

## 2021-05-29 RX ADMIN — MELATONIN TAB 3 MG 3 MG: 3 TAB at 21:02

## 2021-05-29 RX ADMIN — CLOZAPINE 200 MG: 100 TABLET ORAL at 21:03

## 2021-05-29 RX ADMIN — ALUMINUM HYDROXIDE, MAGNESIUM HYDROXIDE, AND SIMETHICONE 30 ML: 200; 200; 20 SUSPENSION ORAL at 17:44

## 2021-05-29 RX ADMIN — FLUTICASONE PROPIONATE 1 PUFF: 110 AEROSOL, METERED RESPIRATORY (INHALATION) at 19:24

## 2021-05-29 RX ADMIN — FLUOXETINE 30 MG: 20 CAPSULE ORAL at 09:26

## 2021-05-29 RX ADMIN — OLANZAPINE 10 MG: 5 TABLET, ORALLY DISINTEGRATING ORAL at 21:02

## 2021-05-29 RX ADMIN — LORATADINE 10 MG: 10 TABLET ORAL at 09:26

## 2021-05-29 RX ADMIN — Medication 1 CAPSULE: at 09:26

## 2021-05-29 RX ADMIN — SERTRALINE HYDROCHLORIDE 50 MG: 50 TABLET ORAL at 09:26

## 2021-05-29 RX ADMIN — OLANZAPINE 10 MG: 5 TABLET, ORALLY DISINTEGRATING ORAL at 09:26

## 2021-05-29 NOTE — PROGRESS NOTES
Problem: Falls - Risk of  Goal: *Absence of Falls  Description: Document Jem Broad Fall Risk and appropriate interventions in the flowsheet.   Outcome: Progressing Towards Goal  Note: Fall Risk Interventions:            Medication Interventions: Teach patient to arise slowly                   Problem: Psychosis  Goal: *STG: Decreased delusional thinking  Outcome: Progressing Towards Goal  Goal: *STG: Remains safe in hospital  Outcome: Progressing Towards Goal  Goal: *STG/LTG: Complies with medication therapy  Outcome: Progressing Towards Goal

## 2021-05-29 NOTE — BH NOTES
Patient case discussed in the treatment team spoke with the nursing staff chart reviewed patient is pacing still talking to self but not as loud not as engaged not as argumentative and very feeble mumbling under the breath. No agitation no aggression not suicidal not homicidal today he tells me he is thinking about staying with his father.   Continued inpatient level of care indicated her his vital signs today temperature 98.5 pulse 117 blood pressure 129/81 respiration 17 SPO2 100 room air labs reviewed his CMP is unremarkable TSH 2.92 continued inpatient level of care indicated he is EKG is unremarkable

## 2021-05-29 NOTE — BH NOTES
Pt.up in hallways at intervals, affect is flat, appetite good, appearance is in hospital gown, denies feeling suicidal or depressed, appears happy, content,accepts medications, observed smiling and talking to self inappropriately,denies anxiety,no interaction with peers, no agitation or aggressive behaviors at present. No concerns voiced. remains on close observation.

## 2021-05-29 NOTE — BH NOTES
Patient is elated, smiling, pacing halls with other peers. Denies depression, anxiety, SI, and HI. He does admit to still having auditory hallucinations but are less frequent, and are good voices he says. He does still talk to self in different languages as he is in the room and pacing in [place]. Pt was med compliant and has been resting well all night, no distress noted, respirations regular and unlabored. Will continue to monitor patient every 15 mins as per unit protocol.

## 2021-05-29 NOTE — PROGRESS NOTES
Progress Note  Date:2021       Room:[facility]  Patient Name:Jon Fagan     YOB: 1995     Age:25 y.o. Subjective    Subjective   Patient mostly isolated to himself often noted talking to himself smiling to himself. Denies feeling suicidal or homicidal.  Often smiling to himself no agitation or aggression. Disheveled appearance. Mental status examination-patient is mostly to himself talking to himself actively responding to internal stimuli happy smiling and talking to himself. Disheveled still with poor insight  Review of Systems no complaints  Objective         Vitals Last 24 Hours:  TEMPERATURE:  Temp  Av.4 °F (36.9 °C)  Min: 98.4 °F (36.9 °C)  Max: 98.4 °F (36.9 °C)  RESPIRATIONS RANGE: Resp  Av  Min: 18  Max: 20  PULSE OXIMETRY RANGE: SpO2  Av %  Min: 98 %  Max: 98 %  PULSE RANGE: Pulse  Av.5  Min: 121  Max: 124  BLOOD PRESSURE RANGE: Systolic (69EYQ), RJA:766 , Min:119 , FWF:881   ; Diastolic (14PYB), VDY:06, Min:65, Max:73    I/O (24Hr): No intake or output data in the 24 hours ending 21 1306  Objective  Labs/Imaging/Diagnostics    Labs:  CBC:No results for input(s): WBC, RBC, HGB, HCT, MCV, RDW, PLT, HGBEXT, HCTEXT, PLTEXT in the last 72 hours. CHEMISTRIES:No results for input(s): NA, K, CL, CO2, BUN, CA, PHOS, MG in the last 72 hours. No lab exists for component: CREATININE, GLUCOSEPT/INR:No results for input(s): INR, INREXT in the last 72 hours. No lab exists for component: PROTIME  APTT:No results for input(s): APTT in the last 72 hours. LIVER PROFILE:No results for input(s): AST, ALT in the last 72 hours. No lab exists for component: Roberto Christianson ALKPHOS  Lab Results   Component Value Date/Time    ALT (SGPT) 47 2021 06:18 AM    AST (SGOT) 26 2021 06:18 AM    Alk.  phosphatase 95 2021 06:18 AM    Bilirubin, total 0.3 2021 06:18 AM       Imaging Last 24 Hours:  No results found.  Assessment//Plan   Principal Problem:    Schizophrenia ([facility] [address] (5/17/2021)      Assessment & Plan  Continue current medications  No dystonic symptoms noted      Current Facility-Administered Medications:     cloZAPine (CLOZARIL) tablet 50 mg, 50 mg, Oral, DAILY, Esperanza ANDREWS MD, 50 mg at 05/29/21 0926    ziprasidone (GEODON) capsule 20 mg, 20 mg, Oral, Q12H PRN, Gualberto Shrestha MD, 20 mg at 05/23/21 1511    alum-mag hydroxide-simeth (MYLANTA) oral suspension 30 mL, 30 mL, Oral, Q4H PRN, Gualberto Shrestha MD, 30 mL at 05/25/21 1837    LORazepam (ATIVAN) tablet 1 mg, 1 mg, Oral, Q4H PRN, Gualberto Shrestha MD, 1 mg at 05/22/21 1805    OLANZapine (ZyPREXA zydis) disintegrating tablet 10 mg, 10 mg, Oral, BID, Alea Gudino MD, 10 mg at 05/29/21 0796    hydrOXYzine HCL (ATARAX) tablet 50 mg, 50 mg, Oral, TID PRN, Gualberto Shrestha MD, 50 mg at 05/25/21 1837    LORazepam (ATIVAN) injection 1 mg, 1 mg, IntraMUSCular, Q4H PRN, Gualberto Shrestha MD    traZODone (DESYREL) tablet 50 mg, 50 mg, Oral, QHS PRN, Gualberto Shrestha MD, 50 mg at 05/26/21 2113    acetaminophen (TYLENOL) tablet 650 mg, 650 mg, Oral, Q4H PRN, Gualberto Shrestha MD, 650 mg at 05/28/21 1650    magnesium hydroxide (MILK OF MAGNESIA) 400 mg/5 mL oral suspension 30 mL, 30 mL, Oral, DAILY PRN, Gualberto Shrestha MD, 30 mL at 05/26/21 2113    diphenhydrAMINE (BENADRYL) injection 50 mg, 50 mg, IntraMUSCular, BID PRN, Gualberto Shrestha MD    ziprasidone (GEODON) 20 mg in sterile water (preservative free) 1 mL injection, 20 mg, IntraMUSCular, Q12H PRN, Gualberto Shrestha MD    aspirin chewable tablet 81 mg, 81 mg, Oral, DAILY, Gualberto Shrestha MD, 81 mg at 05/29/21 0926    fluticasone (FLOVENT HFA) 110 mcg inhaler, 1 Puff, Inhalation, BID RT, Gualberto Shrestha MD, 1 Puff at 05/28/21 2122    cloZAPine (CLOZARIL) tablet 200 mg, 200 mg, Oral, QHS, Gualberto Shrestha MD, 200 mg at 05/28/21 2121    docusate sodium (COLACE) capsule 100 mg, 100 mg, Oral, DAILY, Nicki Xie MD, 100 mg at 05/29/21 0926    FLUoxetine (PROzac) capsule 30 mg, 30 mg, Oral, DAILY, Nicki Xie MD, 30 mg at 05/29/21 0926    fluticasone propionate (FLONASE) 50 mcg/actuation nasal spray 1 Spray, 1 Spray, Both Nostrils, DAILY, Nicki Xie MD, 1 Edgar at 05/28/21 1000    Multivitamins with Min No.7-FA (V-C FORTE) capsule 1 Cap, 1 Capsule, Oral, DAILY, Nicki Xie MD, 1 Capsule at 05/29/21 0926    loratadine (CLARITIN) tablet 10 mg, 10 mg, Oral, DAILY, Nicki Xie MD, 10 mg at 05/29/21 0926    nicotine (NICODERM CQ) 21 mg/24 hr patch 1 Patch, 1 Patch, TransDERmal, DAILY, Chaz Rocha MD, 1 Patch at 05/29/21 0926    pantoprazole (PROTONIX) tablet 40 mg, 40 mg, Oral, ACB, Nicki Xie MD, 40 mg at 05/28/21 0802    sertraline (ZOLOFT) tablet 50 mg, 50 mg, Oral, DAILY, Nicki Xie MD, 50 mg at 05/29/21 0926    albuterol (PROVENTIL HFA, VENTOLIN HFA, PROAIR HFA) inhaler 2 Puff, 2 Puff, Inhalation, Q6H PRN, Chaz Rocha MD    melatonin tablet 3 mg, 3 mg, Oral, QHS, Nicki Xie MD, 3 mg at 05/28/21 2122    polyethylene glycol (MIRALAX) packet 17 g, 17 g, Oral, DAILY PRN, Nicki Xie MD  Electronically signed by Beba Blackwell MD on 5/29/2021 at 1:06 PM

## 2021-05-29 NOTE — GROUP NOTE
IP  GROUP DOCUMENTATION INDIVIDUAL Group Therapy Note Date: 5/29/2021 Group Start Time: 1300 Group End Time: 6968 Group Topic: Process Group - Inpatient [facility]M 2 BH NON ACUTE Denice Arabi IP  GROUP DOCUMENTATION GROUP Group Therapy Note This writer facilitated a process group geared towards cognitive distortions, health coping mechanisms, and symptom management. This writer tasked each patient with discussing their thoughts and feelings regarding their current treatment. Attendees: 10 out of 19 Attendance: Did not attend Patient's Goal:  N/A Interventions/techniques: N/A Follows Directions: N/A Interactions: N/A Mental Status: N/A Behavior/appearance: N/A Goals Achieved: N/A Additional Notes:  Patient refused to attend group despite encouragement from staff. Lorri Collet

## 2021-05-30 PROCEDURE — 74011250637 HC RX REV CODE- 250/637: Performed by: PSYCHIATRY & NEUROLOGY

## 2021-05-30 PROCEDURE — 65220000003 HC RM SEMIPRIVATE PSYCH

## 2021-05-30 RX ADMIN — Medication 1 CAPSULE: at 08:32

## 2021-05-30 RX ADMIN — FLUOXETINE 30 MG: 20 CAPSULE ORAL at 08:32

## 2021-05-30 RX ADMIN — CLOZAPINE 50 MG: 25 TABLET ORAL at 08:32

## 2021-05-30 RX ADMIN — SERTRALINE HYDROCHLORIDE 50 MG: 50 TABLET ORAL at 08:32

## 2021-05-30 RX ADMIN — ASPIRIN 81 MG: 81 TABLET, CHEWABLE ORAL at 08:32

## 2021-05-30 RX ADMIN — OLANZAPINE 10 MG: 5 TABLET, ORALLY DISINTEGRATING ORAL at 20:35

## 2021-05-30 RX ADMIN — MAGNESIUM HYDROXIDE 30 ML: 400 SUSPENSION ORAL at 20:35

## 2021-05-30 RX ADMIN — PANTOPRAZOLE SODIUM 40 MG: 40 TABLET, DELAYED RELEASE ORAL at 06:39

## 2021-05-30 RX ADMIN — MELATONIN TAB 3 MG 3 MG: 3 TAB at 21:05

## 2021-05-30 RX ADMIN — CLOZAPINE 200 MG: 100 TABLET ORAL at 21:05

## 2021-05-30 RX ADMIN — LORATADINE 10 MG: 10 TABLET ORAL at 08:32

## 2021-05-30 RX ADMIN — DOCUSATE SODIUM 100 MG: 100 CAPSULE, LIQUID FILLED ORAL at 08:32

## 2021-05-30 RX ADMIN — HYDROXYZINE HYDROCHLORIDE 50 MG: 50 TABLET, FILM COATED ORAL at 20:35

## 2021-05-30 RX ADMIN — OLANZAPINE 10 MG: 5 TABLET, ORALLY DISINTEGRATING ORAL at 08:32

## 2021-05-30 RX ADMIN — FLUTICASONE PROPIONATE 1 PUFF: 110 AEROSOL, METERED RESPIRATORY (INHALATION) at 19:40

## 2021-05-30 NOTE — PROGRESS NOTES
Progress Note  Date:2021       Room:[facility]  Patient Name:Jon Batista     YOB: 1995     Age:25 y.o. Subjective    Subjective  Patient in bed this morning did not wake up on calling  He has been compliant with his medications no active suicidal homicidal ideations voiced. He still presents disheveled to himself isolated. Keeps in conversation mostly to himself. Insight judgment coping remains impaired  Review of Systems  Objective         Vitals Last 24 Hours:  TEMPERATURE:  Temp  Av °F (36.7 °C)  Min: 97.9 °F (36.6 °C)  Max: 98.1 °F (36.7 °C)  RESPIRATIONS RANGE: Resp  Av  Min: 18  Max: 20  PULSE OXIMETRY RANGE: SpO2  Av %  Min: 99 %  Max: 99 %  PULSE RANGE: Pulse  Av.5  Min: 81  Max: 120  BLOOD PRESSURE RANGE: Systolic (15BJP), IVL:328 , Min:141 , YYE:574   ; Diastolic (21HAQ), CKY:61, Min:84, Max:93    I/O (24Hr): No intake or output data in the 24 hours ending 21 1345  Objective  Labs/Imaging/Diagnostics    Labs:  CBC:No results for input(s): WBC, RBC, HGB, HCT, MCV, RDW, PLT, HGBEXT, HCTEXT, PLTEXT in the last 72 hours. CHEMISTRIES:No results for input(s): NA, K, CL, CO2, BUN, CA, PHOS, MG in the last 72 hours. No lab exists for component: CREATININE, GLUCOSEPT/INR:No results for input(s): INR, INREXT in the last 72 hours. No lab exists for component: PROTIME  APTT:No results for input(s): APTT in the last 72 hours. LIVER PROFILE:No results for input(s): AST, ALT in the last 72 hours. No lab exists for component: [facility], ALKPHOS  Lab Results   Component Value Date/Time    ALT (SGPT) 47 2021 06:18 AM    AST (SGOT) 26 2021 06:18 AM    Alk. phosphatase 95 2021 06:18 AM    Bilirubin, total 0.3 2021 06:18 AM       Imaging Last 24 Hours:  No results found.   Assessment//Plan   Principal Problem:    Schizophrenia ([facility] [address] (2021)      Assessment & Plan  Continue current medications  No side effects voiced no active SI HI voiced      Current Facility-Administered Medications:     cloZAPine (CLOZARIL) tablet 50 mg, 50 mg, Oral, DAILY, Suzie ANDREWS MD, 50 mg at 05/30/21 8668    ziprasidone (GEODON) capsule 20 mg, 20 mg, Oral, Q12H PRN, Suzie ANDREWS MD, 20 mg at 05/23/21 1511    alum-mag hydroxide-simeth (MYLANTA) oral suspension 30 mL, 30 mL, Oral, Q4H PRN, Suzie ANDREWS MD, 30 mL at 05/29/21 1744    LORazepam (ATIVAN) tablet 1 mg, 1 mg, Oral, Q4H PRN, Alexia Saeed MD, 1 mg at 05/22/21 1805    OLANZapine (ZyPREXA zydis) disintegrating tablet 10 mg, 10 mg, Oral, BID, Sury Mendoza MD, 10 mg at 05/30/21 8780    hydrOXYzine HCL (ATARAX) tablet 50 mg, 50 mg, Oral, TID PRN, Alexia Saeed MD, 50 mg at 05/29/21 1733    LORazepam (ATIVAN) injection 1 mg, 1 mg, IntraMUSCular, Q4H PRN, Alexia Saeed MD    traZODone (DESYREL) tablet 50 mg, 50 mg, Oral, QHS PRN, Alexia Saeed MD, 50 mg at 05/26/21 2113    acetaminophen (TYLENOL) tablet 650 mg, 650 mg, Oral, Q4H PRN, Suzie ANDREWS MD, 650 mg at 05/29/21 1733    magnesium hydroxide (MILK OF MAGNESIA) 400 mg/5 mL oral suspension 30 mL, 30 mL, Oral, DAILY PRN, Suzie ANDREWS MD, 30 mL at 05/26/21 2113    diphenhydrAMINE (BENADRYL) injection 50 mg, 50 mg, IntraMUSCular, BID PRN, Chaz Rocha MD    ziprasidone (GEODON) 20 mg in sterile water (preservative free) 1 mL injection, 20 mg, IntraMUSCular, Q12H PRN, Chaz Rocha MD    aspirin chewable tablet 81 mg, 81 mg, Oral, DAILY, Suzie ANDREWS MD, 81 mg at 05/30/21 0832    fluticasone (FLOVENT HFA) 110 mcg inhaler, 1 Puff, Inhalation, BID RT, Alxeia Saeed MD, 1 Puff at 05/29/21 1924    cloZAPine (CLOZARIL) tablet 200 mg, 200 mg, Oral, QHS, Chaz Rocha MD, 200 mg at 05/29/21 2103    docusate sodium (COLACE) capsule 100 mg, 100 mg, Oral, DAILY, Chaz Rocha MD, 100 mg at 05/30/21 0832    FLUoxetine (PROzac) capsule 30 mg, 30 mg, Oral, DAILY, Amy Cai MD, 30 mg at 05/30/21 5151    fluticasone propionate (FLONASE) 50 mcg/actuation nasal spray 1 Spray, 1 Spray, Both Nostrils, DAILY, Amy Cai MD, 1 Spray at 05/28/21 1000    Multivitamins with Min No.7-FA (V-C FORTE) capsule 1 Cap, 1 Capsule, Oral, DAILY, Amy Cai MD, 1 Capsule at 05/30/21 0832    loratadine (CLARITIN) tablet 10 mg, 10 mg, Oral, DAILY, Amy Cai MD, 10 mg at 05/30/21 0832    nicotine (NICODERM CQ) 21 mg/24 hr patch 1 Patch, 1 Patch, TransDERmal, DAILY, Chaz Rocha MD, 1 Patch at 05/30/21 0831    pantoprazole (PROTONIX) tablet 40 mg, 40 mg, Oral, ACB, Amy Cai MD, 40 mg at 05/30/21 0639    sertraline (ZOLOFT) tablet 50 mg, 50 mg, Oral, DAILY, Amy Cai MD, 50 mg at 05/30/21 0832    albuterol (PROVENTIL HFA, VENTOLIN HFA, PROAIR HFA) inhaler 2 Puff, 2 Puff, Inhalation, Q6H PRN, Chaz Rocha MD    melatonin tablet 3 mg, 3 mg, Oral, QHS, Amy Cai MD, 3 mg at 05/29/21 2102    polyethylene glycol (MIRALAX) packet 17 g, 17 g, Oral, DAILY PRN, Amy Cai MD  Electronically signed by Kenny Cast MD on 5/30/2021 at 1:45 PM

## 2021-05-30 NOTE — GROUP NOTE
IP  GROUP DOCUMENTATION INDIVIDUAL Group Therapy Note Date: 5/30/2021 Group Start Time: 5716 Group End Time: 6247 Group Topic: Process Group - Inpatient [facility] 2 BH NON ACUTE Clayburn Ficks IP  GROUP DOCUMENTATION GROUP Group Therapy Note This writer facilitated a process group to encourage patient to share their thoughts, feelings, and treatment goals. This writer discussed the benefits of a safety plan and encourage the patient to identify their triggers, warning signs, coping mechanisms, and social/community supports. This writer also discussed the importance of self-care. Attendees: 5 out of 11 Attendance: Did not attend Patient's Goal:  N/A Interventions/techniques: N/A Follows Directions: N/A Interactions: N/A Mental Status: N/A Behavior/appearance: N/A Goals Achieved: N/A Additional Notes:  Patient was observed sleeping when prompted to attend group. Avery Santos

## 2021-05-30 NOTE — PROGRESS NOTES
Presents alert and oriented x3 with fair insight into current situation. Flat but will engage appropriately when approached by staff. Denies SI/HI/AVH but does appear to be responding to internal stimuli (talks as if in conversation with someone). Effectively communicates needs. Visible in milieu walking in hallway. Isolates from peers. Accepts all medications.

## 2021-05-30 NOTE — BH NOTES
Pt.is up at intervals, affect is flat,appetite good, appearance is appropriate, denies feeling suicidal or depressed. pt. continues to pace halls at [facility] a smile, observed talking to self in different language, and doing karate kicks in his room. appears to be responding to internal stimuli. No other concerns voiced, remains on close observation.

## 2021-05-30 NOTE — PROGRESS NOTES
Problem: Falls - Risk of  Goal: *Absence of Falls  Description: Document Zoe Bishop Fall Risk and appropriate interventions in the flowsheet.   Outcome: Progressing Towards Goal  Note: Fall Risk Interventions:            Medication Interventions: Teach patient to arise slowly                   Problem: Psychosis  Goal: *STG: Decreased hallucinations  Outcome: Progressing Towards Goal  Goal: *STG: Decreased delusional thinking  Outcome: Progressing Towards Goal  Goal: *STG: Remains safe in hospital  Outcome: Progressing Towards Goal  Goal: *STG/LTG: Complies with medication therapy  Outcome: Progressing Towards Goal

## 2021-05-31 LAB
BASOPHILS # BLD: 0 K/UL (ref 0–0.1)
BASOPHILS NFR BLD: 0 % (ref 0–1)
DIFFERENTIAL METHOD BLD: ABNORMAL
EOSINOPHIL # BLD: 0.1 K/UL (ref 0–0.4)
EOSINOPHIL NFR BLD: 1 % (ref 0–7)
ERYTHROCYTE [DISTWIDTH] IN BLOOD BY AUTOMATED COUNT: 14.4 % (ref 11.5–14.5)
HCT VFR BLD AUTO: 44.4 % (ref 36.6–50.3)
HGB BLD-MCNC: 14.2 G/DL (ref 12.1–17)
IMM GRANULOCYTES # BLD AUTO: 0.1 K/UL (ref 0–0.04)
IMM GRANULOCYTES NFR BLD AUTO: 1 % (ref 0–0.5)
LYMPHOCYTES # BLD: 2.4 K/UL (ref 0.8–3.5)
LYMPHOCYTES NFR BLD: 26 % (ref 12–49)
MCH RBC QN AUTO: 28.1 PG (ref 26–34)
MCHC RBC AUTO-ENTMCNC: 32 G/DL (ref 30–36.5)
MCV RBC AUTO: 87.9 FL (ref 80–99)
MONOCYTES # BLD: 0.4 K/UL (ref 0–1)
MONOCYTES NFR BLD: 5 % (ref 5–13)
NEUTS SEG # BLD: 6.3 K/UL (ref 1.8–8)
NEUTS SEG NFR BLD: 67 % (ref 32–75)
NRBC # BLD: 0 K/UL (ref 0–0.01)
NRBC BLD-RTO: 0 PER 100 WBC
PLATELET # BLD AUTO: 348 K/UL (ref 150–400)
PMV BLD AUTO: 9.5 FL (ref 8.9–12.9)
RBC # BLD AUTO: 5.05 M/UL (ref 4.1–5.7)
WBC # BLD AUTO: 9.3 K/UL (ref 4.1–11.1)

## 2021-05-31 PROCEDURE — 74011250637 HC RX REV CODE- 250/637: Performed by: PSYCHIATRY & NEUROLOGY

## 2021-05-31 PROCEDURE — 36415 COLL VENOUS BLD VENIPUNCTURE: CPT

## 2021-05-31 PROCEDURE — 85025 COMPLETE CBC W/AUTO DIFF WBC: CPT

## 2021-05-31 PROCEDURE — 93005 ELECTROCARDIOGRAM TRACING: CPT

## 2021-05-31 PROCEDURE — 65220000003 HC RM SEMIPRIVATE PSYCH

## 2021-05-31 RX ADMIN — ASPIRIN 81 MG: 81 TABLET, CHEWABLE ORAL at 08:26

## 2021-05-31 RX ADMIN — FLUOXETINE 30 MG: 20 CAPSULE ORAL at 08:27

## 2021-05-31 RX ADMIN — MAGNESIUM HYDROXIDE 30 ML: 400 SUSPENSION ORAL at 19:20

## 2021-05-31 RX ADMIN — LORAZEPAM 1 MG: 1 TABLET ORAL at 20:06

## 2021-05-31 RX ADMIN — SERTRALINE HYDROCHLORIDE 50 MG: 50 TABLET ORAL at 08:27

## 2021-05-31 RX ADMIN — PANTOPRAZOLE SODIUM 40 MG: 40 TABLET, DELAYED RELEASE ORAL at 06:30

## 2021-05-31 RX ADMIN — OLANZAPINE 10 MG: 5 TABLET, ORALLY DISINTEGRATING ORAL at 21:19

## 2021-05-31 RX ADMIN — Medication 1 CAPSULE: at 08:26

## 2021-05-31 RX ADMIN — FLUTICASONE PROPIONATE 1 PUFF: 110 AEROSOL, METERED RESPIRATORY (INHALATION) at 20:06

## 2021-05-31 RX ADMIN — MELATONIN TAB 3 MG 3 MG: 3 TAB at 21:19

## 2021-05-31 RX ADMIN — LORATADINE 10 MG: 10 TABLET ORAL at 08:27

## 2021-05-31 RX ADMIN — CLOZAPINE 50 MG: 25 TABLET ORAL at 08:27

## 2021-05-31 RX ADMIN — CLOZAPINE 200 MG: 100 TABLET ORAL at 21:18

## 2021-05-31 RX ADMIN — HYDROXYZINE HYDROCHLORIDE 50 MG: 50 TABLET, FILM COATED ORAL at 13:23

## 2021-05-31 RX ADMIN — OLANZAPINE 10 MG: 5 TABLET, ORALLY DISINTEGRATING ORAL at 08:27

## 2021-05-31 RX ADMIN — DOCUSATE SODIUM 100 MG: 100 CAPSULE, LIQUID FILLED ORAL at 08:26

## 2021-05-31 NOTE — BH NOTES
FAMILY SESSION    The therapist facilitated a family session between the patient and his father. The patient spoke in [address] and the therapist requested that they either speak in [address] or translate what was being said for her. The patient said that they would prefer to speak in [address] The patient spoke with his father for several minutes in [address], and it sounded like they were having a disagreement towards the end. The patient also hung up on his father. The therapist asked what they had spoken about. The patient said that he wanted to leave and that his dad would not come pick him up and called him a \"\". The therapist called the patient's dad afterwards and asked what was talked about during the phone call. He said that the patient does not sound stable. He said that the patient said that he has sent him money. The therapist asked him if the patient talks to himself when he is stable and the father said that he does not. He was appreciative of the phone call.

## 2021-05-31 NOTE — GROUP NOTE
ANG  GROUP DOCUMENTATION INDIVIDUAL Group Therapy Note Date: 5/31/2021 Group Start Time: 3848 Group End Time: 1200 Group Topic: Education Group - Inpatient [facility]  GROUP DOCUMENTATION GROUP Group Therapy Note The therapist facilitated a group discussing the benefits of showing gratitude, and went over a worksheet with the patients. She encouraged patient participation and feedback. Attendees: 7 Attendance: Did not attend Patient's Goal: Interventions/techniques: Follows Directions:  
 
Interactions:  
 
Mental Status:  
 
Behavior/appearance:  
 
Goals Achieved: Additional Notes: The patient was invited to group but did not attend Maria Weaver

## 2021-05-31 NOTE — PROGRESS NOTES
Progress Note  Date:2021       Room:[facility]  Patient Name:Jon Higginbotham     YOB: 1995     Age:25 y.o. Subjective    Subjective   Patient reports he has been feeling okay. He still talks to himself pleasant. He is able to care for simple conversation. He is able to be redirectable times not laughing as much to himself as he was in the beginning of his admission. He states he was talking [place] when responding to me. Mental status examination-patient is alert oriented to place person some improvement in his hygiene and grooming noted still in hospital gown talking to self pleasant little bit more redirectable and can keep simple conversations no active SI HI voiced insight judgment coping remains impaired  Review of Systems  Objective         Vitals Last 24 Hours:  TEMPERATURE:  Temp  Av.3 °F (36.8 °C)  Min: 98 °F (36.7 °C)  Max: 98.4 °F (36.9 °C)  RESPIRATIONS RANGE: Resp  Av  Min: 18  Max: 18  PULSE OXIMETRY RANGE: SpO2  Av %  Min: 97 %  Max: 97 %  PULSE RANGE: Pulse  Av.7  Min: 74  Max: 125  BLOOD PRESSURE RANGE: Systolic (06AAY), DVE:000 , Min:132 , EOD:450   ; Diastolic (29IFQ), QND:79, Min:68, Max:69    I/O (24Hr): No intake or output data in the 24 hours ending 21 1213  Objective  Labs/Imaging/Diagnostics    Labs:  CBC:  Recent Labs     21  0918   WBC 9.3   RBC 5.05   HGB 14.2   HCT 44.4   MCV 87.9   RDW 14.4        CHEMISTRIES:No results for input(s): NA, K, CL, CO2, BUN, CA, PHOS, MG in the last 72 hours. No lab exists for component: CREATININE, GLUCOSEPT/INR:No results for input(s): INR, INREXT in the last 72 hours. No lab exists for component: PROTIME  APTT:No results for input(s): APTT in the last 72 hours. LIVER PROFILE:No results for input(s): AST, ALT in the last 72 hours.     No lab exists for component: BILIDIR, BILITOT, ALKPHOS  Lab Results   Component Value Date/Time    ALT (SGPT) 47 05/19/2021 06:18 AM    AST (SGOT) 26 05/19/2021 06:18 AM    Alk. phosphatase 95 05/19/2021 06:18 AM    Bilirubin, total 0.3 05/19/2021 06:18 AM       Imaging Last 24 Hours:  No results found.   Assessment//Plan   Principal Problem:    Schizophrenia ([facility] [address] (5/17/2021)      Assessment & Plan  Continue current medications no side effects voiced      Current Facility-Administered Medications:     cloZAPine (CLOZARIL) tablet 50 mg, 50 mg, Oral, DAILY, Mendel ANDREWS MD, 50 mg at 05/31/21 0827    ziprasidone (GEODON) capsule 20 mg, 20 mg, Oral, Q12H PRN, Mendel ANDREWS MD, 20 mg at 05/23/21 1511    alum-mag hydroxide-simeth (MYLANTA) oral suspension 30 mL, 30 mL, Oral, Q4H PRN, Nicki Xie MD, 30 mL at 05/29/21 1744    LORazepam (ATIVAN) tablet 1 mg, 1 mg, Oral, Q4H PRN, Nicki Xie MD, 1 mg at 05/22/21 1805    OLANZapine (ZyPREXA zydis) disintegrating tablet 10 mg, 10 mg, Oral, BID, Jayna Morgan MD, 10 mg at 05/31/21 0827    hydrOXYzine HCL (ATARAX) tablet 50 mg, 50 mg, Oral, TID PRN, Nicki Xie MD, 50 mg at 05/30/21 2035    LORazepam (ATIVAN) injection 1 mg, 1 mg, IntraMUSCular, Q4H PRN, Nicki Xie MD    traZODone (DESYREL) tablet 50 mg, 50 mg, Oral, QHS PRN, Nicki Xie MD, 50 mg at 05/26/21 2113    acetaminophen (TYLENOL) tablet 650 mg, 650 mg, Oral, Q4H PRN, Nicki Xie MD, 650 mg at 05/29/21 1733    magnesium hydroxide (MILK OF MAGNESIA) 400 mg/5 mL oral suspension 30 mL, 30 mL, Oral, DAILY PRN, Nicki Xie MD, 30 mL at 05/30/21 2035    diphenhydrAMINE (BENADRYL) injection 50 mg, 50 mg, IntraMUSCular, BID PRN, Chaz Rocha MD    ziprasidone (GEODON) 20 mg in sterile water (preservative free) 1 mL injection, 20 mg, IntraMUSCular, Q12H PRN, Nicki Xie MD    aspirin chewable tablet 81 mg, 81 mg, Oral, DAILY, Nicki Xie MD, 81 mg at 05/31/21 0826    fluticasone (FLOVENT HFA) 110 mcg inhaler, 1 Puff, Inhalation, BID RT, Nicki Xie MD, 1 Puff at 05/30/21 1940    cloZAPine (CLOZARIL) tablet 200 mg, 200 mg, Oral, QHS, Lonza Mer ANDREWS MD, 200 mg at 05/30/21 2105    docusate sodium (COLACE) capsule 100 mg, 100 mg, Oral, DAILY, Daryle Capers, MD, 100 mg at 05/31/21 3342    FLUoxetine (PROzac) capsule 30 mg, 30 mg, Oral, DAILY, Daryle Capers, MD, 30 mg at 05/31/21 0827    fluticasone propionate (FLONASE) 50 mcg/actuation nasal spray 1 Spray, 1 Spray, Both Nostrils, DAILY, Daryle Capers, MD, 1 Gilbert at 05/28/21 1000    Multivitamins with Min No.7-FA (V-C FORTE) capsule 1 Cap, 1 Capsule, Oral, DAILY, Daryle Capers, MD, 1 Capsule at 05/31/21 0826    loratadine (CLARITIN) tablet 10 mg, 10 mg, Oral, DAILY, Daryle Capers, MD, 10 mg at 05/31/21 0827    nicotine (NICODERM CQ) 21 mg/24 hr patch 1 Patch, 1 Patch, TransDERmal, DAILY, Daryle Capers, MD, 1 Patch at 05/31/21 0826    pantoprazole (PROTONIX) tablet 40 mg, 40 mg, Oral, ACB, Daryle Capers, MD, 40 mg at 05/31/21 0630    sertraline (ZOLOFT) tablet 50 mg, 50 mg, Oral, DAILY, Daryle Capers, MD, 50 mg at 05/31/21 0827    albuterol (PROVENTIL HFA, VENTOLIN HFA, PROAIR HFA) inhaler 2 Puff, 2 Puff, Inhalation, Q6H PRN, Chaz Rocha MD    melatonin tablet 3 mg, 3 mg, Oral, QHS, Daryle Capers, MD, 3 mg at 05/30/21 2105    polyethylene glycol (MIRALAX) packet 17 g, 17 g, Oral, DAILY PRN, Daryle Capers, MD  Electronically signed by Rachael Proctor MD on 5/31/2021 at 12:13 PM

## 2021-05-31 NOTE — BH NOTES
Pt. Is up at intervals, walks halls smiling and talking to self, denies feeling suicidal, denies feeling depressed. pt.interacts at times with staff.takes care of his ADLs accepts medications as ordered, pt.denies hallucinations but observed preoccupied talking to self. no other concerns voiced. remains on close observation.

## 2021-05-31 NOTE — PROGRESS NOTES
Presents alert and oriented x3 with some insight into current situation. Flat affect, cooperative and will engage in conversation when approached by staff. Denies SI/HI/AVH and depression. Endorsed anxiety 5/10 and constipation: received Atarax Milk of Magnesia with QHS medications. Visible in milieu but continues to isolate from peers.

## 2021-06-01 PROCEDURE — 65220000003 HC RM SEMIPRIVATE PSYCH

## 2021-06-01 PROCEDURE — 74011250637 HC RX REV CODE- 250/637: Performed by: PSYCHIATRY & NEUROLOGY

## 2021-06-01 RX ADMIN — LORATADINE 10 MG: 10 TABLET ORAL at 09:23

## 2021-06-01 RX ADMIN — HYDROXYZINE HYDROCHLORIDE 50 MG: 50 TABLET, FILM COATED ORAL at 19:42

## 2021-06-01 RX ADMIN — FLUTICASONE PROPIONATE 1 PUFF: 110 AEROSOL, METERED RESPIRATORY (INHALATION) at 09:25

## 2021-06-01 RX ADMIN — ACETAMINOPHEN 650 MG: 325 TABLET, FILM COATED ORAL at 19:42

## 2021-06-01 RX ADMIN — PANTOPRAZOLE SODIUM 40 MG: 40 TABLET, DELAYED RELEASE ORAL at 06:00

## 2021-06-01 RX ADMIN — CLOZAPINE 200 MG: 100 TABLET ORAL at 21:03

## 2021-06-01 RX ADMIN — MELATONIN TAB 3 MG 3 MG: 3 TAB at 21:03

## 2021-06-01 RX ADMIN — FLUTICASONE PROPIONATE 1 SPRAY: 50 SPRAY, METERED NASAL at 09:27

## 2021-06-01 RX ADMIN — FLUOXETINE 30 MG: 20 CAPSULE ORAL at 09:24

## 2021-06-01 RX ADMIN — Medication 1 CAPSULE: at 09:24

## 2021-06-01 RX ADMIN — CLOZAPINE 50 MG: 25 TABLET ORAL at 09:23

## 2021-06-01 RX ADMIN — DOCUSATE SODIUM 100 MG: 100 CAPSULE, LIQUID FILLED ORAL at 09:23

## 2021-06-01 RX ADMIN — SERTRALINE HYDROCHLORIDE 50 MG: 50 TABLET ORAL at 09:00

## 2021-06-01 RX ADMIN — OLANZAPINE 10 MG: 5 TABLET, ORALLY DISINTEGRATING ORAL at 10:23

## 2021-06-01 RX ADMIN — MAGNESIUM HYDROXIDE 30 ML: 400 SUSPENSION ORAL at 19:42

## 2021-06-01 RX ADMIN — OLANZAPINE 10 MG: 5 TABLET, ORALLY DISINTEGRATING ORAL at 21:03

## 2021-06-01 RX ADMIN — LORAZEPAM 1 MG: 1 TABLET ORAL at 21:28

## 2021-06-01 RX ADMIN — ASPIRIN 81 MG: 81 TABLET, CHEWABLE ORAL at 09:23

## 2021-06-01 RX ADMIN — FLUTICASONE PROPIONATE 1 PUFF: 110 AEROSOL, METERED RESPIRATORY (INHALATION) at 21:04

## 2021-06-01 NOTE — BH NOTES
Patient pulse was 130, he was giving a Ativan 1 mg po, patient stated that he was nervous. He has been up walking around, shaking his head and hyperactive. Patient was prompted to sit down or lay down and relax. Will continue to nominator. He able to follow simple instruction. He compliant with medication. He denies depression, SI & HI. Patient complaint of constipation, he was giving MOM as ordered for relief, results pending.

## 2021-06-01 NOTE — BH NOTES
Behavioral Health Treatment Team Note     Patient goal(s) for today: \"Stop hearing voices, take my medication, eat healthy, and take showers. I want to show these demons that I'm strong. \"   Treatment team focus/goals: To continue group therapy and medication management. Progress note: Pt presented in his room, somewhat unkempt and disheveled. Pt was heard speaking to himself prior to the therapist entering the room to speak with him. Pt denied AVH despite having been heard responding to internal stimuli. Pt denied SI/HI/ Pt stated that his mood is \"chill. \" Pt endorsed anxiety at 6/10 and depression at 6/10. LOS:  15  Expected LOS: Recommitted up to 6/11/21    Insurance info/prescription coverage:  [address]  Date of last family contact:  5/31/2021  Family requesting physician contact today:  no  Discharge plan:  The pt will be discharged back to his group home when appropriate. Pt sees NP Michaelle Sagastume for medication management. Guns in the home:  no   Outpatient provider(s):   Michaelle Sagastume NP    Participating treatment team members: Vidhya Walsh, (assigned SW), Lucho Ac LMSW

## 2021-06-01 NOTE — BH NOTES
Client is pleasant and cooperative. Alert and oriented x 3. Appearance is untidy. He has pressured speech and continues to talk to himself. He has a good appetite and reports sleeping well. Denies feeling suicidal or homicidal.All over his room and sleeping in both beds. Preoccupied with inner thoughts. Minimum interaction noted between peers. Remains on close observation for safety.

## 2021-06-01 NOTE — BH NOTES
Patient pulse remains at 130, after prn medication. Patient voices no complains. Skin warm & dry to touch. Notify Dr. Anisa Rodriguez about patient pulse rate. Order stat EKG and force fluids. Respiratory therapy paged. She up on floor @ 2240. Patient was prompted to drink fluid (water). He did accepted 480 cc of fluids initially. 2248 Report, read to Dr. Anisa Rodriguez by ulysses phone, pulse rate 111, on the EKG. Continue to monitor.

## 2021-06-01 NOTE — GROUP NOTE
IP  GROUP DOCUMENTATION INDIVIDUAL Group Therapy Note Date: 6/1/2021 Group Start Time: 1100 Group End Time: 1718 Group Topic: Process Group - Inpatient SRM 2  NON ACUTE Fernando Doran [facility] GROUP DOCUMENTATION GROUP Group Therapy Note Attendees: 7 out of 11 Patients were invited to group and encouraged to discuss their thoughts, feelings, and emotions openly while in the group setting. Patients were asked to discuss their plans to maintain their health upon discharge and how they feel that this hospital stay has helped them. Lastly, patients were asked to listen respectfully to and be supportive of their peers. Attendance: Did not attend Patient's Goal:  N/A Interventions/techniques: Other N/A Follows Directions: Other N/A Interactions: Other N/A Mental Status: Other N/A Behavior/appearance: N/A Goals Achieved: N/A Additional Notes:  Pt did not attend group despite having been encouraged to do so. [place]

## 2021-06-02 LAB
ATRIAL RATE: 111 BPM
CALCULATED P AXIS, ECG09: 53 DEGREES
CALCULATED R AXIS, ECG10: 52 DEGREES
CALCULATED T AXIS, ECG11: 19 DEGREES
DIAGNOSIS, 93000: NORMAL
P-R INTERVAL, ECG05: 202 MS
Q-T INTERVAL, ECG07: 318 MS
QRS DURATION, ECG06: 86 MS
QTC CALCULATION (BEZET), ECG08: 432 MS
VENTRICULAR RATE, ECG03: 111 BPM

## 2021-06-02 PROCEDURE — 74011250637 HC RX REV CODE- 250/637: Performed by: PSYCHIATRY & NEUROLOGY

## 2021-06-02 PROCEDURE — 65220000003 HC RM SEMIPRIVATE PSYCH

## 2021-06-02 RX ADMIN — ACETAMINOPHEN 650 MG: 325 TABLET, FILM COATED ORAL at 12:41

## 2021-06-02 RX ADMIN — HYDROXYZINE HYDROCHLORIDE 50 MG: 50 TABLET, FILM COATED ORAL at 19:39

## 2021-06-02 RX ADMIN — SERTRALINE HYDROCHLORIDE 50 MG: 50 TABLET ORAL at 08:55

## 2021-06-02 RX ADMIN — FLUTICASONE PROPIONATE 1 PUFF: 110 AEROSOL, METERED RESPIRATORY (INHALATION) at 19:40

## 2021-06-02 RX ADMIN — ASPIRIN 81 MG: 81 TABLET, CHEWABLE ORAL at 08:55

## 2021-06-02 RX ADMIN — LORAZEPAM 1 MG: 1 TABLET ORAL at 12:41

## 2021-06-02 RX ADMIN — FLUTICASONE PROPIONATE 1 SPRAY: 50 SPRAY, METERED NASAL at 08:56

## 2021-06-02 RX ADMIN — FLUTICASONE PROPIONATE 1 PUFF: 110 AEROSOL, METERED RESPIRATORY (INHALATION) at 08:57

## 2021-06-02 RX ADMIN — Medication 1 CAPSULE: at 08:55

## 2021-06-02 RX ADMIN — CLOZAPINE 200 MG: 100 TABLET ORAL at 21:01

## 2021-06-02 RX ADMIN — FLUOXETINE 30 MG: 20 CAPSULE ORAL at 08:55

## 2021-06-02 RX ADMIN — PANTOPRAZOLE SODIUM 40 MG: 40 TABLET, DELAYED RELEASE ORAL at 06:46

## 2021-06-02 RX ADMIN — DOCUSATE SODIUM 100 MG: 100 CAPSULE, LIQUID FILLED ORAL at 08:55

## 2021-06-02 RX ADMIN — LORATADINE 10 MG: 10 TABLET ORAL at 08:55

## 2021-06-02 RX ADMIN — CLOZAPINE 50 MG: 25 TABLET ORAL at 08:55

## 2021-06-02 RX ADMIN — OLANZAPINE 10 MG: 5 TABLET, ORALLY DISINTEGRATING ORAL at 21:01

## 2021-06-02 RX ADMIN — OLANZAPINE 10 MG: 5 TABLET, ORALLY DISINTEGRATING ORAL at 08:55

## 2021-06-02 RX ADMIN — MAGNESIUM HYDROXIDE 30 ML: 400 SUSPENSION ORAL at 12:41

## 2021-06-02 RX ADMIN — LORAZEPAM 1 MG: 1 TABLET ORAL at 22:53

## 2021-06-02 RX ADMIN — TRAZODONE HYDROCHLORIDE 50 MG: 50 TABLET ORAL at 00:02

## 2021-06-02 RX ADMIN — ACETAMINOPHEN 650 MG: 325 TABLET, FILM COATED ORAL at 22:53

## 2021-06-02 RX ADMIN — MELATONIN TAB 3 MG 3 MG: 3 TAB at 21:01

## 2021-06-02 NOTE — PROGRESS NOTES
Problem: Falls - Risk of  Goal: *Absence of Falls  Description: Document Alisa Fentonn Fall Risk and appropriate interventions in the flowsheet.   Outcome: Progressing Towards Goal  Note: Fall Risk Interventions:     Medication Interventions: Teach patient to arise slowly       Problem: Psychosis  Goal: *STG: Decreased delusional thinking  Outcome: Progressing Towards Goal  Goal: *STG: Remains safe in hospital  Outcome: Progressing Towards Goal  Goal: *STG: Participates in individual and group therapy  Outcome: Progressing Towards Goal  Goal: *STG/LTG: Complies with medication therapy  Outcome: Progressing Towards Goal     Problem: Psychosis  Goal: *STG: Decreased hallucinations  Outcome: Not Progressing Towards Goal

## 2021-06-02 NOTE — BH NOTES
Patient case discussed in the treatment team patient seen and patient, not seen talking to himself loudly in bed withdrawn no agitation no aggression apparently patient his father but he will be retained at [employer] is not responding to the hallucinations not talking loud.   No more pacing complying with medication no infection no fever temperature 98.6 pulse 128 blood pressure 132/61 respiration 20 continued inpatient level of care indicated we will check with the landlord his therapist people that are involved over there is a baseline has very she received a blast baseline already we will double check thank you end of dictation labs CBC done unremarkable yesterday's labs from yesterday's CBC unremarkable continued inpatient level of care indicated

## 2021-06-02 NOTE — BH NOTES
Patient is pleasant, smiling but very anxious. Pt continues to talk to self out loud due to internal stimuli pretty

## 2021-06-02 NOTE — BH NOTES
Patient alert and oriented to self. Noted responding to internal stimuli. Isolative. Ate meal in solarium and then went back to room. Attended group. Denies SI/GI/AH/VH. Took medications as ordered. Will continue to monitor.

## 2021-06-02 NOTE — BH NOTES
Patient case discussed in the treatment team and patient isolating himself I did not see him talking to himself or pacing however some of the nursing staff noted in talking with even if it is low intensity not robust not intense. We are wondering if this is baseline. No agitation or aggression no suicidal ideation no homicidal no side effects some low energy level noted his vital signs today temperature 97.8 pulse 71 blood pressure 124/87 respiration 18 no new labs are resulted at this time we will repeat CBC wondering whether this is new based  check with the group home when the baseline is like.   I wonder whether he can start down to some partial hospital program reassess the case tomorrow continued inpatient level of care indicated

## 2021-06-02 NOTE — BH NOTES
Behavioral Health Treatment Team Note     Patient goal(s) for today: To read a book  Treatment team focus/goals: to continue group therapy and medication management. Progress note: The patient was presenting with a bright affect and congruent mood. He appeared slightly elevated at first and was laughing while making grand hand gestures towards the therapist, he explained that he was \"in a good mood\". He denied SI/HI and AH/VH's. The therapist mentioned that staff, including herself, has observed him talking to himself. He said that he does that to keep himself occupied. He said that he has imaginary friends he speaks to. An inpatient level of care is still necessary due to the patient's continue psychotic symptoms. The therapist will continue to coordinate discharge planning with his group home.      LOS:  16  Expected LOS: 16-18 days     Insurance info/prescription coverage:  [facility]   Date of last family contact:  5/31/21  Family requesting physician contact today:  no  Discharge plan:  The patient will return to his group home  Guns in the home:  no   Outpatient provider(s):  He sees SIMI Farrell    Participating treatment team members: Samara Malhotra, JUAN

## 2021-06-02 NOTE — GROUP NOTE
ANG  GROUP DOCUMENTATION INDIVIDUAL Group Therapy Note Date: 6/2/2021 Group Start Time: 1100 Group End Time: 8457 Group Topic: Education Group - Inpatient SRM 2 BEHA HLTH ACUTE Federico Dy ANG  GROUP DOCUMENTATION GROUP Group Therapy Note Attendees: 6/12 Psych Ed: Pts were asked to share how they were doing and if they had d/c plans as a check in question. Pts were then encouraged to share their thoughts on safety plans and shared warning signs and coping skills. Pts were then encouraged to reflect on group Attendance: Did not attend Additional Notes:  Pt was encouraged to attend but chose not to do so [employer]

## 2021-06-03 PROCEDURE — 74011250637 HC RX REV CODE- 250/637: Performed by: PSYCHIATRY & NEUROLOGY

## 2021-06-03 PROCEDURE — 65220000003 HC RM SEMIPRIVATE PSYCH

## 2021-06-03 RX ADMIN — ASPIRIN 81 MG: 81 TABLET, CHEWABLE ORAL at 09:25

## 2021-06-03 RX ADMIN — FLUTICASONE PROPIONATE 1 SPRAY: 50 SPRAY, METERED NASAL at 09:25

## 2021-06-03 RX ADMIN — SERTRALINE HYDROCHLORIDE 50 MG: 50 TABLET ORAL at 09:24

## 2021-06-03 RX ADMIN — HYDROXYZINE HYDROCHLORIDE 50 MG: 50 TABLET, FILM COATED ORAL at 17:40

## 2021-06-03 RX ADMIN — PANTOPRAZOLE SODIUM 40 MG: 40 TABLET, DELAYED RELEASE ORAL at 09:24

## 2021-06-03 RX ADMIN — Medication 1 CAPSULE: at 09:24

## 2021-06-03 RX ADMIN — ACETAMINOPHEN 650 MG: 325 TABLET, FILM COATED ORAL at 17:40

## 2021-06-03 RX ADMIN — CLOZAPINE 50 MG: 25 TABLET ORAL at 09:25

## 2021-06-03 RX ADMIN — LORATADINE 10 MG: 10 TABLET ORAL at 09:24

## 2021-06-03 RX ADMIN — DOCUSATE SODIUM 100 MG: 100 CAPSULE, LIQUID FILLED ORAL at 09:25

## 2021-06-03 RX ADMIN — CLOZAPINE 200 MG: 100 TABLET ORAL at 21:00

## 2021-06-03 RX ADMIN — LORAZEPAM 1 MG: 1 TABLET ORAL at 21:23

## 2021-06-03 RX ADMIN — MAGNESIUM HYDROXIDE 30 ML: 400 SUSPENSION ORAL at 21:23

## 2021-06-03 RX ADMIN — FLUTICASONE PROPIONATE 1 PUFF: 110 AEROSOL, METERED RESPIRATORY (INHALATION) at 20:25

## 2021-06-03 RX ADMIN — OLANZAPINE 10 MG: 5 TABLET, ORALLY DISINTEGRATING ORAL at 20:26

## 2021-06-03 RX ADMIN — OLANZAPINE 10 MG: 5 TABLET, ORALLY DISINTEGRATING ORAL at 09:28

## 2021-06-03 RX ADMIN — MELATONIN TAB 3 MG 3 MG: 3 TAB at 21:00

## 2021-06-03 RX ADMIN — FLUOXETINE 30 MG: 20 CAPSULE ORAL at 09:24

## 2021-06-03 RX ADMIN — FLUTICASONE PROPIONATE 1 PUFF: 110 AEROSOL, METERED RESPIRATORY (INHALATION) at 08:00

## 2021-06-03 RX ADMIN — LORAZEPAM 1 MG: 1 TABLET ORAL at 11:29

## 2021-06-03 NOTE — PROGRESS NOTES
Problem: Falls - Risk of  Goal: *Absence of Falls  Description: Document Akash Weaverire Fall Risk and appropriate interventions in the flowsheet.   Outcome: Progressing Towards Goal  Note: Fall Risk Interventions:            Medication Interventions: Teach patient to arise slowly                   Problem: Psychosis  Goal: *STG: Decreased hallucinations  Outcome: Progressing Towards Goal  Goal: *STG: Decreased delusional thinking  Outcome: Progressing Towards Goal  Goal: *STG: Remains safe in hospital  Outcome: Progressing Towards Goal  Goal: *STG/LTG: Complies with medication therapy  Outcome: Progressing Towards Goal

## 2021-06-03 NOTE — BH NOTES
Behavioral Health Treatment Team Note      Patient goal(s) for today: \"To Sleep\"  Treatment team focus/goals: to continue group therapy and medication management.      Progress note: Pt presented as a polite and cooperative  male who appeared slightly older than stated age of 26years old. He appeared oriented to person, place, time and situation. He made no eye contact  and responded to questions in a manner suggestive that he needed extra help understanding some of the questions. Pt presented for this discussion disheveled and wearing a green hospital gown. Pt denied suicidal or homicidal ideation. Pt was pacing in the hopkins.  He stopped to speak about his goal however doesn't appear to want to discuss anything further.      LOS:  16                     Expected LOS: 16-18 days      Insurance info/prescription coverage:  [employer]   Date of last family contact:  5/31/21  Family requesting physician contact today:  no  Discharge plan:  The patient will return to his group home  Guns in the home:  no   Outpatient provider(s):  He sees NP Tariq Anthony     Participating treatment team members: Sam West, [address] , [address] [place] [address]

## 2021-06-03 NOTE — GROUP NOTE
[facility] GROUP DOCUMENTATION INDIVIDUAL Group Therapy Note Date: 6/3/2021 Group Start Time: 1577 Group End Time: 8522 Group Topic: [employer] SRM 2 BH NON ACUTE [facility] GROUP DOCUMENTATION GROUP Group Therapy Note Attendees: 6 Attendance: Did not attend Patient's Goal: Interventions/techniques: Follows Directions:  
 
Interactions:  
 
Mental Status:  
 
Behavior/appearance:  
 
Goals Achieved: Additional Notes:  Pt was encouraged to attend group but refused. [facility]

## 2021-06-03 NOTE — PROGRESS NOTES
Problem: Falls - Risk of  Goal: *Absence of Falls  Description: Document Brandon Garcia Fall Risk and appropriate interventions in the flowsheet.   Outcome: Progressing Towards Goal  Note: Fall Risk Interventions:            Medication Interventions: Teach patient to arise slowly                   Problem: Patient Education: Go to Patient Education Activity  Goal: Patient/Family Education  Outcome: Progressing Towards Goal     Problem: Psychosis  Goal: *STG: Decreased hallucinations  Outcome: Progressing Towards Goal  Goal: *STG: Decreased delusional thinking  Outcome: Progressing Towards Goal  Goal: *STG: Remains safe in hospital  Outcome: Progressing Towards Goal  Goal: *STG/LTG: Complies with medication therapy  Outcome: Progressing Towards Goal

## 2021-06-03 NOTE — GROUP NOTE
[facility] GROUP DOCUMENTATION INDIVIDUAL Group Therapy Note Date: 2021 Group Start Time:  Group End Time:  Group Topic: Recreational/Music Therapy SRM 2  NON ACUTE Robert Wills [facility] GROUP DOCUMENTATION GROUP Group Therapy Note Attendees:  Introduced healthy leisure task skill group as positive way to cope and manage mood. Attendance: Attended Patient's Goal:  STG/LTG: Demonstrates improved social functioning by responding appropriately to staff Interventions/techniques: Art integration, Provide feedback and Supported Follows Directions: Followed directions Interactions: Interacted appropriately Mental Status: Calm and Flat Behavior/appearance: Attentive and Cooperative Goals Achieved: Able to engage in interactions and Able to listen to others Additional Notes: Attended group and actively participated. Was receptive to intervention. Pt  listened to music played in group. Made music selections and verbalized the song was played at his grandmothers . . Interacted with staff and peers. Used leisure time constructively.   
 
[address]

## 2021-06-03 NOTE — GROUP NOTE
ANG  GROUP DOCUMENTATION INDIVIDUAL Group Therapy Note Date: 6/3/2021 Group Start Time: 7955 Group End Time: 1918 Group Topic: Process Group - Inpatient SRM 2 BEHA HLTH ACUTE Abdiaziz Muniz [facility] GROUP DOCUMENTATION GROUP Group Therapy Note Attendees: 7/14 Process: Pts were asked to share a colour to describe how they were feeling. Pts were then encouraged to participate in anger ball activity. Pts shared answers to various questions and provided feedback to one another. Writer facilitated breathing exercise and pts were asked to reflect on group. Attendance: Did not attend Additional Notes:  Pt was encouraged to attend but chose not to do so [facility]

## 2021-06-03 NOTE — BH NOTES
Patient alert and oriented x. Up ambulating in hallway and room. Ate breakfast in room. Took medications as ordered. Flat affect with intermittent brightening. In solarium to watch TV and use the phone. Medicated with Lorazepam for anxiety with some improvement noted. Denies SI/HI/VH, appears to respond to internal stimuli at times. No distress noted will continue to  Monitor.

## 2021-06-04 PROCEDURE — 74011250637 HC RX REV CODE- 250/637: Performed by: PSYCHIATRY & NEUROLOGY

## 2021-06-04 PROCEDURE — 65220000003 HC RM SEMIPRIVATE PSYCH

## 2021-06-04 RX ORDER — DIPHENHYDRAMINE HCL 25 MG
25 CAPSULE ORAL
Status: DISCONTINUED | OUTPATIENT
Start: 2021-06-04 | End: 2021-06-08 | Stop reason: HOSPADM

## 2021-06-04 RX ADMIN — HYDROXYZINE HYDROCHLORIDE 50 MG: 50 TABLET, FILM COATED ORAL at 16:30

## 2021-06-04 RX ADMIN — ACETAMINOPHEN 650 MG: 325 TABLET, FILM COATED ORAL at 16:30

## 2021-06-04 RX ADMIN — OLANZAPINE 10 MG: 5 TABLET, ORALLY DISINTEGRATING ORAL at 21:08

## 2021-06-04 RX ADMIN — SERTRALINE HYDROCHLORIDE 50 MG: 50 TABLET ORAL at 08:59

## 2021-06-04 RX ADMIN — Medication 1 CAPSULE: at 08:59

## 2021-06-04 RX ADMIN — FLUTICASONE PROPIONATE 1 SPRAY: 50 SPRAY, METERED NASAL at 22:16

## 2021-06-04 RX ADMIN — LORAZEPAM 1 MG: 1 TABLET ORAL at 19:59

## 2021-06-04 RX ADMIN — OLANZAPINE 10 MG: 5 TABLET, ORALLY DISINTEGRATING ORAL at 09:02

## 2021-06-04 RX ADMIN — FLUTICASONE PROPIONATE 1 PUFF: 110 AEROSOL, METERED RESPIRATORY (INHALATION) at 09:09

## 2021-06-04 RX ADMIN — MELATONIN TAB 3 MG 3 MG: 3 TAB at 21:08

## 2021-06-04 RX ADMIN — DIPHENHYDRAMINE HYDROCHLORIDE 25 MG: 25 CAPSULE ORAL at 17:49

## 2021-06-04 RX ADMIN — PANTOPRAZOLE SODIUM 40 MG: 40 TABLET, DELAYED RELEASE ORAL at 06:30

## 2021-06-04 RX ADMIN — LORATADINE 10 MG: 10 TABLET ORAL at 09:02

## 2021-06-04 RX ADMIN — FLUTICASONE PROPIONATE 1 SPRAY: 50 SPRAY, METERED NASAL at 09:09

## 2021-06-04 RX ADMIN — CLOZAPINE 200 MG: 100 TABLET ORAL at 21:08

## 2021-06-04 RX ADMIN — FLUOXETINE 30 MG: 20 CAPSULE ORAL at 09:02

## 2021-06-04 RX ADMIN — ASPIRIN 81 MG: 81 TABLET, CHEWABLE ORAL at 08:59

## 2021-06-04 RX ADMIN — CLOZAPINE 50 MG: 25 TABLET ORAL at 08:59

## 2021-06-04 RX ADMIN — DOCUSATE SODIUM 100 MG: 100 CAPSULE, LIQUID FILLED ORAL at 09:02

## 2021-06-04 RX ADMIN — MAGNESIUM HYDROXIDE 30 ML: 400 SUSPENSION ORAL at 19:59

## 2021-06-04 NOTE — GROUP NOTE
IP  GROUP DOCUMENTATION INDIVIDUAL Group Therapy Note Date: 6/4/2021 Group Start Time: 7407 Group End Time: 6646 Group Topic: Recreational/Music Therapy SRM 2  NON ACUTE Brendan Floor 
 
IP  GROUP DOCUMENTATION GROUP Group Therapy Note Facilitated leisure skills group to reinforce positive coping through music, group activities, social interaction and arts/crafts Attendees: 7/10 Attendance: Attended Patient's Goal: Attend group daily Interventions/techniques: Art integration and Supported Follows Directions: Followed directions Interactions: Interacted appropriately Mental Status: Calm Behavior/appearance: Cooperative Goals Achieved: Able to engage in interactions and Able to listen to others Additional Notes:  Receptive to listening to music and a song he selected. Declined to work on leisure task. Interacted with select peer and staff when prompted XI Valentin

## 2021-06-04 NOTE — GROUP NOTE
IP BH GROUP DOCUMENTATION INDIVIDUAL Group Therapy Note Date: 6/4/2021 Group Start Time: 1300 Group End Time: 8052 Group Topic: Education Group - Inpatient SRM 2 BH NON ACUTE Veatrice Prost IP BH GROUP DOCUMENTATION GROUP Group Therapy Note Attendees: 7 out of 10 
 
1pm Psycho-Education Group Topic: Setting SMART Goals Patients were invited to group and educated on the topic of setting SMART goals. Patients were given a worksheet and guided on how to create and accomplish their goals by using the SMART goal method. Patients were asked to share their goals with the group and to discuss how they plan to accomplish them. Patients were also asked to listen respectfully to and be supportive of their peers. Attendance: Did not attend Patient's Goal:  N/A Interventions/techniques: Other N/A Follows Directions: Other N/A Interactions: Other N/A Mental Status: Other N/A Behavior/appearance: N/A Goals Achieved: N/A Additional Notes:  Pt did not attend group despite having been encouraged to do so. [place]

## 2021-06-04 NOTE — PROGRESS NOTES
Presents alert and oriented to all 4 spheres. Smiling and conversational when approached by staff and peers this PM. Visible in milieu engaging and walking in group/socializing. Denies SI/HI/AVH and depression. Endorses anxiety 6/10 and received Ativan as well as Milk of Magnesia for constipation (last BM 6/3/21). Accepted all other QHS medication.

## 2021-06-04 NOTE — BH NOTES
Patient case discussed in the treatment team patient fluctuating but mostly calm quite little bit deteriorations personal hygiene and much redirection during talking to himself laughing not as intense. We will check indicated VALDEMAR see what this baseline is.   Compliant with medication complaining of some sinus problems want Claritin-D and some nasal spray not suicidal not homicidal hallucinations are less frequent and less intense is vital signs today temperature 97.8 pulse 87 blood pressure 1/24/1967 respiration 18 SPO2 98 no infection thank you end of dictation

## 2021-06-04 NOTE — BH NOTES
Behavioral Health Treatment Team Note      Patient goal(s) for today: \"Attend groups and walk 60 laps\"  Treatment team focus/goals: to continue group therapy and medication management.      Progress note: Pt presented as a polite and cooperative  male who appeared slightly older than stated age of 26years old. He appeared oriented to person, place, time and situation. He made eye contact  and reported feeling tired. Pt presented for this discussion disheveled and wearing a green hospital gown. Pt denied suicidal or homicidal ideation.  Pt was lying down in his bed.     LOS:  17                   Expected TJB: 93-77 days      Insurance info/prescription coverage:  [facility]   Date of last family contact:  5/31/21  Family requesting physician contact today:  no  Discharge plan:  The patient will return to his group home  Guns in the home:  no   Outpatient provider(s):  He sees NP Tony Rebolledo     Participating treatment team members: Jon Gaspar, LPC, CSOTP, Antonio Valdivia, MSW Intern

## 2021-06-05 PROCEDURE — 65220000003 HC RM SEMIPRIVATE PSYCH

## 2021-06-05 PROCEDURE — 74011250637 HC RX REV CODE- 250/637: Performed by: PSYCHIATRY & NEUROLOGY

## 2021-06-05 RX ADMIN — OLANZAPINE 10 MG: 5 TABLET, ORALLY DISINTEGRATING ORAL at 21:14

## 2021-06-05 RX ADMIN — MELATONIN TAB 3 MG 3 MG: 3 TAB at 21:14

## 2021-06-05 RX ADMIN — LORAZEPAM 1 MG: 1 TABLET ORAL at 23:01

## 2021-06-05 RX ADMIN — MAGNESIUM HYDROXIDE 30 ML: 400 SUSPENSION ORAL at 23:00

## 2021-06-05 RX ADMIN — FLUOXETINE 30 MG: 20 CAPSULE ORAL at 08:25

## 2021-06-05 RX ADMIN — SERTRALINE HYDROCHLORIDE 50 MG: 50 TABLET ORAL at 08:29

## 2021-06-05 RX ADMIN — HYDROXYZINE HYDROCHLORIDE 50 MG: 50 TABLET, FILM COATED ORAL at 09:46

## 2021-06-05 RX ADMIN — Medication 1 CAPSULE: at 08:25

## 2021-06-05 RX ADMIN — LORAZEPAM 1 MG: 1 TABLET ORAL at 14:40

## 2021-06-05 RX ADMIN — PANTOPRAZOLE SODIUM 40 MG: 40 TABLET, DELAYED RELEASE ORAL at 08:26

## 2021-06-05 RX ADMIN — LORATADINE 10 MG: 10 TABLET ORAL at 08:26

## 2021-06-05 RX ADMIN — FLUTICASONE PROPIONATE 1 SPRAY: 50 SPRAY, METERED NASAL at 09:00

## 2021-06-05 RX ADMIN — ASPIRIN 81 MG: 81 TABLET, CHEWABLE ORAL at 08:25

## 2021-06-05 RX ADMIN — DOCUSATE SODIUM 100 MG: 100 CAPSULE, LIQUID FILLED ORAL at 08:25

## 2021-06-05 RX ADMIN — OLANZAPINE 10 MG: 5 TABLET, ORALLY DISINTEGRATING ORAL at 08:26

## 2021-06-05 RX ADMIN — FLUTICASONE PROPIONATE 1 PUFF: 110 AEROSOL, METERED RESPIRATORY (INHALATION) at 08:00

## 2021-06-05 RX ADMIN — CLOZAPINE 50 MG: 25 TABLET ORAL at 08:26

## 2021-06-05 RX ADMIN — CLOZAPINE 200 MG: 100 TABLET ORAL at 21:14

## 2021-06-05 RX ADMIN — FLUTICASONE PROPIONATE 1 PUFF: 110 AEROSOL, METERED RESPIRATORY (INHALATION) at 20:00

## 2021-06-05 RX ADMIN — ACETAMINOPHEN 650 MG: 325 TABLET, FILM COATED ORAL at 23:01

## 2021-06-05 RX ADMIN — TRAZODONE HYDROCHLORIDE 50 MG: 50 TABLET ORAL at 23:00

## 2021-06-05 RX ADMIN — DIPHENHYDRAMINE HYDROCHLORIDE 25 MG: 25 CAPSULE ORAL at 17:03

## 2021-06-05 NOTE — PROGRESS NOTES
Pt walking around unit. Pt reports his mood is anxious and rates his anxiety 8/10 and that his depression was a 7/10 this evening. Pt reports having a good day. Pt reported that he took 3 showers today to help with his allergies. He denied having SI, HI, or hearing any voices. Pt did request some anxiety medication and something to help his stomach. He was medication compliant, remains safe and under close observation.

## 2021-06-05 NOTE — PROGRESS NOTES
Problem: Falls - Risk of  Goal: *Absence of Falls  Description: Document Natalyaanupam Huerta Fall Risk and appropriate interventions in the flowsheet.   Outcome: Progressing Towards Goal  Note: Fall Risk Interventions:            Medication Interventions: Teach patient to arise slowly

## 2021-06-05 NOTE — GROUP NOTE
IP  GROUP DOCUMENTATION INDIVIDUAL Group Therapy Note Date: 6/5/2021 Group Start Time: 1100 Group End Time: 6302 Group Topic: Process Group - Inpatient SRM 2 BH NON ACUTE Vallorjoshua Brought [facility] GROUP DOCUMENTATION GROUP Group Therapy Note Attendees: 6 Pt's were encouraged to participate in process therapy group to focus on expressing their thoughts and feelings. Pt's were encouraged to discuss their reason for this hospital admission, what changes they are making to be successful when they leave and coping skills that they have learned since they have been here. Pt's were encouraged to be courteous of others and provide support to each others. Attendance: Did not attend Patient's Goal:  NA Interventions/techniques: NA Follows Directions: NA Interactions: NA Mental Status: NA Behavior/appearance: NA 
 
Goals Achieved: NA Additional Notes:  Pt did not attend group despite being invited.   
 
Jose Martin Sanches, [address] [facility], [address] [place] [address]

## 2021-06-05 NOTE — BH NOTES
Pt up and out of room on time for meals. At times Pt in room napping and at times walking the unit. Medication compliant and no negative effects from medications observed or reported. No interactions initiated by Pt observed except coming to staff with requests. Encouraged to attend groups and Pt did not. Eating 100% of meals offered. Denies hallucinations. No management issues on the unit. Pleasant on approach. Continue 15 minute checks to maintain pt safety.

## 2021-06-06 PROCEDURE — 74011250637 HC RX REV CODE- 250/637: Performed by: PSYCHIATRY & NEUROLOGY

## 2021-06-06 PROCEDURE — 65220000003 HC RM SEMIPRIVATE PSYCH

## 2021-06-06 RX ADMIN — Medication 1 CAPSULE: at 08:33

## 2021-06-06 RX ADMIN — DOCUSATE SODIUM 100 MG: 100 CAPSULE, LIQUID FILLED ORAL at 08:33

## 2021-06-06 RX ADMIN — TRAZODONE HYDROCHLORIDE 50 MG: 50 TABLET ORAL at 22:44

## 2021-06-06 RX ADMIN — FLUTICASONE PROPIONATE 1 PUFF: 110 AEROSOL, METERED RESPIRATORY (INHALATION) at 08:00

## 2021-06-06 RX ADMIN — CLOZAPINE 50 MG: 25 TABLET ORAL at 08:33

## 2021-06-06 RX ADMIN — PANTOPRAZOLE SODIUM 40 MG: 40 TABLET, DELAYED RELEASE ORAL at 08:33

## 2021-06-06 RX ADMIN — MELATONIN TAB 3 MG 3 MG: 3 TAB at 21:08

## 2021-06-06 RX ADMIN — LORAZEPAM 1 MG: 1 TABLET ORAL at 22:44

## 2021-06-06 RX ADMIN — OLANZAPINE 10 MG: 5 TABLET, ORALLY DISINTEGRATING ORAL at 20:24

## 2021-06-06 RX ADMIN — CLOZAPINE 200 MG: 100 TABLET ORAL at 21:08

## 2021-06-06 RX ADMIN — FLUOXETINE 30 MG: 20 CAPSULE ORAL at 08:33

## 2021-06-06 RX ADMIN — ACETAMINOPHEN 650 MG: 325 TABLET, FILM COATED ORAL at 17:38

## 2021-06-06 RX ADMIN — FLUTICASONE PROPIONATE 1 PUFF: 110 AEROSOL, METERED RESPIRATORY (INHALATION) at 21:09

## 2021-06-06 RX ADMIN — SERTRALINE HYDROCHLORIDE 50 MG: 50 TABLET ORAL at 08:33

## 2021-06-06 RX ADMIN — DIPHENHYDRAMINE HYDROCHLORIDE 25 MG: 25 CAPSULE ORAL at 17:09

## 2021-06-06 RX ADMIN — LORATADINE 10 MG: 10 TABLET ORAL at 08:33

## 2021-06-06 RX ADMIN — FLUTICASONE PROPIONATE 1 SPRAY: 50 SPRAY, METERED NASAL at 22:44

## 2021-06-06 RX ADMIN — ASPIRIN 81 MG: 81 TABLET, CHEWABLE ORAL at 08:32

## 2021-06-06 RX ADMIN — HYDROXYZINE HYDROCHLORIDE 50 MG: 50 TABLET, FILM COATED ORAL at 20:24

## 2021-06-06 RX ADMIN — OLANZAPINE 10 MG: 5 TABLET, ORALLY DISINTEGRATING ORAL at 08:32

## 2021-06-06 NOTE — BH NOTES
[address] [facility] Shift Note:    Patient was alert and oriented times 4 at the onset of the shift. Associated well with peers. Denied thoughts to harm self or others. Denied audiovisual hallucinations. Appeared to be responding to internal stimuli. Affect bright and euthymic. Spoke to nurse in [facility] [place] and stated he is originally from [place] and immigrated to [facility] with his family when young. Staff continued to monitor on every 15 minute checks. No signs or symptoms of distress.

## 2021-06-06 NOTE — BH NOTES
Pt up and out of room on time for breakfast.  Medication compliant and no negative effects from medications observed or reported. Pt pleasant on approach. Observed as generally remaining to self. Out of room among peers. Pleasant on approach. No management issues on the unit. Pt denies hallucinations. Observed at times seeming to listen to something. Denies suicidal ideation. Continue 15 minute to maintain pt safety.

## 2021-06-06 NOTE — GROUP NOTE
[facility] GROUP DOCUMENTATION INDIVIDUAL Group Therapy Note Date: 6/6/2021 Group Start Time: 1500 Group End Time: 4134 Group Topic: Process Group - Inpatient SRM 2  NON ACUTE Phani Christina [facility] GROUP DOCUMENTATION GROUP Group Therapy Note Attendees: 7 Pt's were encouraged to participate in process group to express their thoughts and feelings while encouraging each other. This group focused on Anger and Self Esteem. We began the group by talking about angry reactions and working on identifying the underlying feelings. Pt's were presented with the \"Iceberg Theory\" which represents Anger as the 10% of people that we see on the surface of the water but the 90% of what is under the surface is what causes the angry reactions. Pt's were encouraged to identify their own underlying feelings to their anger. Group ended with each group member identifying 2 things that they liked about themselves (characteristic, something that they are good at, etc). Then they were encouraged to pick out good attributes about their peers. They were challenged to practice positive self talk for the rest of the day as well as support and encourage each other. We decided to call today \"Self-Esteem Sunday\". Attendance: Attended Patient's Goal:  Pt will identify 2 positive things about themselves. Pt will identify underlying feelings that lead to angry reactions. Pt will process feelings while supporting peers. Interventions/techniques: Informed, Promoted peer support and Supported Follows Directions: Followed directions Interactions: Unable to interact Mental Status: Flat Behavior/appearance: Disheveled and Grooming impaired Goals Achieved: Able to listen to others Additional Notes:  Pt attended this session however didn't interact and got up and left a little early. He appeared flat and stared at the floor.  Pt responded to questions and some prompting but overall just listened.   
 
Roland Abreu, [address] [facility], [address] [place] [address]

## 2021-06-06 NOTE — PROGRESS NOTES
Problem: Psychosis  Goal: *STG: Decreased hallucinations  Outcome: Progressing Towards Goal  Goal: *STG: Decreased delusional thinking  Outcome: Progressing Towards Goal  Goal: *STG: Remains safe in hospital  Outcome: Progressing Towards Goal     Patient stated that he has had a decrease in hallucinations and denies thoughts to harm himself or others. Thus far in this shift patient has remained safe.

## 2021-06-07 LAB
BASOPHILS # BLD: 0 K/UL (ref 0–0.1)
BASOPHILS NFR BLD: 0 % (ref 0–1)
DIFFERENTIAL METHOD BLD: ABNORMAL
EOSINOPHIL # BLD: 0.1 K/UL (ref 0–0.4)
EOSINOPHIL NFR BLD: 1 % (ref 0–7)
ERYTHROCYTE [DISTWIDTH] IN BLOOD BY AUTOMATED COUNT: 14 % (ref 11.5–14.5)
HCT VFR BLD AUTO: 43 % (ref 36.6–50.3)
HGB BLD-MCNC: 13.8 G/DL (ref 12.1–17)
IMM GRANULOCYTES # BLD AUTO: 0.1 K/UL (ref 0–0.04)
IMM GRANULOCYTES NFR BLD AUTO: 1 % (ref 0–0.5)
LYMPHOCYTES # BLD: 3.5 K/UL (ref 0.8–3.5)
LYMPHOCYTES NFR BLD: 34 % (ref 12–49)
MCH RBC QN AUTO: 27.8 PG (ref 26–34)
MCHC RBC AUTO-ENTMCNC: 32.1 G/DL (ref 30–36.5)
MCV RBC AUTO: 86.7 FL (ref 80–99)
MONOCYTES # BLD: 0.9 K/UL (ref 0–1)
MONOCYTES NFR BLD: 8 % (ref 5–13)
NEUTS SEG # BLD: 5.8 K/UL (ref 1.8–8)
NEUTS SEG NFR BLD: 56 % (ref 32–75)
NRBC # BLD: 0 K/UL (ref 0–0.01)
NRBC BLD-RTO: 0 PER 100 WBC
PLATELET # BLD AUTO: 328 K/UL (ref 150–400)
PMV BLD AUTO: 9.2 FL (ref 8.9–12.9)
RBC # BLD AUTO: 4.96 M/UL (ref 4.1–5.7)
WBC # BLD AUTO: 10.4 K/UL (ref 4.1–11.1)

## 2021-06-07 PROCEDURE — 74011250637 HC RX REV CODE- 250/637: Performed by: PSYCHIATRY & NEUROLOGY

## 2021-06-07 PROCEDURE — 74011000250 HC RX REV CODE- 250: Performed by: PSYCHIATRY & NEUROLOGY

## 2021-06-07 PROCEDURE — 36415 COLL VENOUS BLD VENIPUNCTURE: CPT

## 2021-06-07 PROCEDURE — 65220000003 HC RM SEMIPRIVATE PSYCH

## 2021-06-07 PROCEDURE — 85025 COMPLETE CBC W/AUTO DIFF WBC: CPT

## 2021-06-07 RX ORDER — KETOTIFEN FUMARATE 0.35 MG/ML
1 SOLUTION/ DROPS OPHTHALMIC 2 TIMES DAILY
Status: DISCONTINUED | OUTPATIENT
Start: 2021-06-07 | End: 2021-06-08 | Stop reason: HOSPADM

## 2021-06-07 RX ADMIN — ACETAMINOPHEN 650 MG: 325 TABLET, FILM COATED ORAL at 02:26

## 2021-06-07 RX ADMIN — DOCUSATE SODIUM 100 MG: 100 CAPSULE, LIQUID FILLED ORAL at 09:16

## 2021-06-07 RX ADMIN — HYDROXYZINE HYDROCHLORIDE 50 MG: 50 TABLET, FILM COATED ORAL at 13:53

## 2021-06-07 RX ADMIN — MELATONIN TAB 3 MG 3 MG: 3 TAB at 21:17

## 2021-06-07 RX ADMIN — FLUOXETINE 30 MG: 20 CAPSULE ORAL at 09:16

## 2021-06-07 RX ADMIN — CLOZAPINE 200 MG: 100 TABLET ORAL at 21:17

## 2021-06-07 RX ADMIN — KETOTIFEN FUMARATE 1 DROP: 0.35 SOLUTION/ DROPS OPHTHALMIC at 21:17

## 2021-06-07 RX ADMIN — FLUTICASONE PROPIONATE 1 PUFF: 110 AEROSOL, METERED RESPIRATORY (INHALATION) at 09:15

## 2021-06-07 RX ADMIN — LORATADINE 10 MG: 10 TABLET ORAL at 09:16

## 2021-06-07 RX ADMIN — DIPHENHYDRAMINE HYDROCHLORIDE 25 MG: 25 CAPSULE ORAL at 17:26

## 2021-06-07 RX ADMIN — ASPIRIN 81 MG: 81 TABLET, CHEWABLE ORAL at 09:16

## 2021-06-07 RX ADMIN — ALUMINUM HYDROXIDE, MAGNESIUM HYDROXIDE, AND SIMETHICONE 30 ML: 200; 200; 20 SUSPENSION ORAL at 02:27

## 2021-06-07 RX ADMIN — OLANZAPINE 10 MG: 5 TABLET, ORALLY DISINTEGRATING ORAL at 21:16

## 2021-06-07 RX ADMIN — OLANZAPINE 10 MG: 5 TABLET, ORALLY DISINTEGRATING ORAL at 09:16

## 2021-06-07 RX ADMIN — Medication 1 CAPSULE: at 09:20

## 2021-06-07 RX ADMIN — CLOZAPINE 50 MG: 25 TABLET ORAL at 09:16

## 2021-06-07 RX ADMIN — TRAZODONE HYDROCHLORIDE 50 MG: 50 TABLET ORAL at 21:17

## 2021-06-07 RX ADMIN — FLUTICASONE PROPIONATE 1 PUFF: 110 AEROSOL, METERED RESPIRATORY (INHALATION) at 21:16

## 2021-06-07 RX ADMIN — PANTOPRAZOLE SODIUM 40 MG: 40 TABLET, DELAYED RELEASE ORAL at 09:16

## 2021-06-07 RX ADMIN — FLUTICASONE PROPIONATE 1 SPRAY: 50 SPRAY, METERED NASAL at 09:27

## 2021-06-07 RX ADMIN — ACETAMINOPHEN 650 MG: 325 TABLET, FILM COATED ORAL at 13:53

## 2021-06-07 RX ADMIN — LORAZEPAM 1 MG: 1 TABLET ORAL at 19:59

## 2021-06-07 RX ADMIN — SERTRALINE HYDROCHLORIDE 50 MG: 50 TABLET ORAL at 09:16

## 2021-06-07 NOTE — GROUP NOTE
[facility] GROUP DOCUMENTATION INDIVIDUAL Group Therapy Note Date: 6/7/2021 Group Start Time: 1300 Group End Time: 1400 Group Topic: Psychological Group [facility]M [facility] OP Babetta Balloon R 
 
[facility] GROUP DOCUMENTATION GROUP Group Therapy Note Attendees: 7/9 Patients completed a safety plan that includes information on warning signs, coping skills, distractions, and people to call for help. Patients encouraged to discuss openly about their safety plan, gain ideas and support from their peers. Patients also used time to discuss CBT and different ways to manage intrusive negative thoughts. Attendance: Attended Patient's Goal:  Attends activities and groups Interventions/techniques: Informed, Validated, Promoted peer support, Reinforced and Supported Follows Directions: Followed directions Interactions: Interacted appropriately Mental Status: Calm and Congruent Behavior/appearance: Cooperative and Withdrawn/quiet Goals Achieved: Able to engage in interactions and Able to listen to others Additional Notes:  Pt attended group late and left early. Pt did complete a safety plan. Tara Mustafa see facesheet

## 2021-06-07 NOTE — GROUP NOTE
[facility] GROUP DOCUMENTATION INDIVIDUAL Group Therapy Note Date: 6/7/2021 Group Start Time: 1100 Group End Time: 2023 Group Topic: Process Group - Inpatient SRM 2 BEHA TH ACUTE [facility] GROUP DOCUMENTATION GROUP Group Therapy Note Attendees: 5/10 Process: Pts were asked to share something they are proud of as a check in question. Pts were then walked through the goal activity where they listed goals they want to make progress towards, goals they have made some progress towards and glas that they have achieved. Pts were then asked to reflect on group Attendance: Did not attend Additional Notes:  Pt was encouraged to attend but chose not to do so [facility]

## 2021-06-07 NOTE — GROUP NOTE
ANG  GROUP DOCUMENTATION INDIVIDUAL Group Therapy Note Date: 6/7/2021 Group Start Time: 2849 Group End Time: 2209 Group Topic: [employer] SRM 2 BEHA HLTH ACUTE Asuncion Estrada 
 
ANG  GROUP DOCUMENTATION GROUP Group Therapy Note Attendees: 
 
  
 
Attendance: Did not attend Patient's Goal: Interventions/techniques: Follows Directions:  
 
Interactions:  
 
Mental Status:  
 
Behavior/appearance:  
 
Goals Achieved: Additional Notes:  Patient did not attend group.  
 
Elvis Susan

## 2021-06-07 NOTE — PROGRESS NOTES
Discussed case interviewed patient  Taking and tolerating medications well  Able to interact in a reasonable way at times  Redirectable  Appears preoccupied on approach  Communicates in his own way  Medication compliant  Denies being depressed  At times he is preoccupied    Mental status exam  Alert oriented fair eye contact  Speech is goal-directed  Affect is labile  Positive preoccupied  Has some signs of psychosis  Insight and judgment fair    Recommendations  Continue inpatient treatments  Follow-up with psychiatric team tomorrow

## 2021-06-07 NOTE — BH NOTES
Patient has been up on the unit . He  has denied any depression, SI or HI. He has verbalized some anxiety 6/10. He denied any hallucinations or paranoid feeling. He stated he was hoping his father or family would bring him some things today. He has been medication compliant. He has remain safe. He has attended some groups. He has been pacing after dinner & talking to himself. He remains on close observation.

## 2021-06-07 NOTE — BH NOTES
Behavioral Health Treatment Team Note     Patient goal(s) for today: To go home soon  Treatment team focus/goals: To continue group therapy and medication management. Progress note: The patient was presenting with a flat affect and congruent mood. He described his mood as \"chill\". He denied SI/HI and AH/VH's. He said that he feels comfortable returning to his group home when discharged. The therapist told him that they would have a session with them today over the phone, which he was okay with. An inpatient level of care is still necessary due to the patient continuing to respond to internal stimuli.      LOS:  21  Expected LOS: 21-22 days     Insurance info/prescription coverage:  [address]  Date of last family contact:  5/31/21  Family requesting physician contact today:  no  Discharge plan:  The patient will be returning home to his group home   Guns in the home:  no   Outpatient provider(s):  The patient sees PCP Vernon Tyson    Participating treatment team members: Mary Jane Jerry LMSW

## 2021-06-07 NOTE — GROUP NOTE
ANG  GROUP DOCUMENTATION INDIVIDUAL Group Therapy Note Date: 6/7/2021 Group Start Time: 6044 Group End Time: 4071 Group Topic: Nursing SRM 2  NON ACUTE Joanie Woodson LPN IP  GROUP DOCUMENTATION GROUP Group Therapy Note Attendees: 5/9 Attendance: Did not attend Patient's Goal: ID benefits of medications Interventions/techniques: Follows Directions:  
 
Interactions:  
 
Mental Status:  
 
Behavior/appearance:  
 
Goals Achieved: Additional Notes:  Pt. Invited he did not attend Darrion Headley LPN

## 2021-06-07 NOTE — BH NOTES
SESSION WITH GROUP HOME    The therapist facilitated a session between the patient and a staff member from the group home named Jenni. The patient spoke about the progress he feels like he has made here, including tending to his hygiene more by showering. He said that he would feel comfortable leaving tomorrow. He asked Jenni if some of his male peers are still there who he likes. The therapist spoke about the possibility of him taking an injection of his medication before he is discharged. He was kind of hesitant at first, stating that he has been on invega sustenna in the past, which has hurt him while receiving the shot. Jenni also encouraged him to take it, stating that they can always discontinue it if he feels like it is not benefiting him. Everyone involved felt comfortable with him being discharged tomorrow. Jenni said that he would need a cab home tomorrow.

## 2021-06-07 NOTE — PROGRESS NOTES
Problem: Falls - Risk of  Goal: *Absence of Falls  Description: Document Sergei Wong Fall Risk and appropriate interventions in the flowsheet. Outcome: Progressing Towards Goal  Note: Fall Risk Interventions:     Pt ambulates independently and without difficulty. No falls reported or noted. Medication Interventions: Teach patient to arise slowly    Problem: Psychosis  Goal: *STG: Decreased hallucinations  Outcome: Progressing Towards Goal   Pt denies having any A/VH. Problem: Psychosis  Goal: *STG: Remains safe in hospital  Outcome: Progressing Towards Goal   Pt remains safe while in the hospital. No psychotic episodes noted or reported.

## 2021-06-07 NOTE — BH NOTES
The pt has ambulated around the unit throughout the evening shift. He has been pleasant and cooperative. After the pt questioned the writers' nationality, he started speaking in a foreign language. The writer explained to the pt that the writer does not know that language. The pt smiled and spoke in [place]. He c/o feeling anxious at 2024 and was given prn hydroxyzine. The pt stated that his anxiety is high, but he did not rate it. He denies being depressed, or having any SI or A/VH. The pt is med compliant. The writer educated the pt on using his inhaler. The pt stated that he would rather use it like he has been. The pt does need continued education on using the spacer with the inhaler. At 2244, the pt requested prn Ativan due to his anxiety being 10/10. VSS ( BP - 128/85 and his HR 97) His HR was 124 at 1937. When rechecked at 2018,  it was 106. The pt frequently ask for snacks and supplies. Pt given a toothbrush, toothpaste, and lotion. He is med compliant. He requested his Flonase this evening. Pt given prn Trazodone for sleep. 7292 : The pt continues to ambulate around the unit. When encouraged to lay down and rest, pt stated, \" I am a night owl\". The pt has showered and changed his linen. No distress noted and none voiced. Respirations are quiet and unlabored. He remains on close observation for safety. 0119: The pt appears sleepy. He is ambulating in his room. Pt given water to drink and he continues to ambulate around the unit at this time. 1983: The pt is ambulating around the unit. He c/o havng a HA which he rated a 7 to 8/10. Pt received prn Tylenol. He also c/o indigestion and received prn Mylanta. The pt does appear tired and remains calm. 5446: The pt is resting quietly in bed with his eyes closed.

## 2021-06-08 VITALS
RESPIRATION RATE: 17 BRPM | HEART RATE: 84 BPM | SYSTOLIC BLOOD PRESSURE: 116 MMHG | DIASTOLIC BLOOD PRESSURE: 77 MMHG | TEMPERATURE: 97.8 F | HEIGHT: 70 IN | OXYGEN SATURATION: 100 % | BODY MASS INDEX: 30.08 KG/M2 | WEIGHT: 210.1 LBS

## 2021-06-08 PROCEDURE — 74011250637 HC RX REV CODE- 250/637: Performed by: PSYCHIATRY & NEUROLOGY

## 2021-06-08 PROCEDURE — 74011250636 HC RX REV CODE- 250/636: Performed by: PSYCHIATRY & NEUROLOGY

## 2021-06-08 RX ORDER — KETOTIFEN FUMARATE 0.35 MG/ML
1 SOLUTION/ DROPS OPHTHALMIC 2 TIMES DAILY
Qty: 10 ML | Refills: 0 | Status: SHIPPED | OUTPATIENT
Start: 2021-06-08 | End: 2021-06-18

## 2021-06-08 RX ORDER — OLANZAPINE 10 MG/1
10 TABLET, ORALLY DISINTEGRATING ORAL 2 TIMES DAILY
Qty: 60 TABLET | Refills: 0 | Status: SHIPPED | OUTPATIENT
Start: 2021-06-08

## 2021-06-08 RX ORDER — IBUPROFEN 200 MG
1 TABLET ORAL DAILY
Qty: 30 PATCH | Refills: 0 | Status: SHIPPED | OUTPATIENT
Start: 2021-06-09 | End: 2021-07-09

## 2021-06-08 RX ORDER — CLOZAPINE 50 MG/1
50 TABLET ORAL DAILY
Qty: 30 TABLET | Refills: 0 | Status: SHIPPED | OUTPATIENT
Start: 2021-06-09

## 2021-06-08 RX ORDER — DIPHENHYDRAMINE HCL 25 MG
25 CAPSULE ORAL
Qty: 30 CAPSULE | Refills: 0 | Status: SHIPPED | OUTPATIENT
Start: 2021-06-08 | End: 2021-06-18

## 2021-06-08 RX ORDER — PANTOPRAZOLE SODIUM 40 MG/1
40 TABLET, DELAYED RELEASE ORAL
Qty: 30 TABLET | Refills: 0 | Status: SHIPPED | OUTPATIENT
Start: 2021-06-09

## 2021-06-08 RX ORDER — LANOLIN ALCOHOL/MO/W.PET/CERES
3 CREAM (GRAM) TOPICAL
Qty: 30 TABLET | Refills: 0 | Status: SHIPPED | OUTPATIENT
Start: 2021-06-08

## 2021-06-08 RX ORDER — ALBUTEROL SULFATE 90 UG/1
2 AEROSOL, METERED RESPIRATORY (INHALATION)
Qty: 1 INHALER | Refills: 0 | Status: SHIPPED | OUTPATIENT
Start: 2021-06-08

## 2021-06-08 RX ORDER — GUAIFENESIN 100 MG/5ML
81 LIQUID (ML) ORAL DAILY
Qty: 30 TABLET | Refills: 0 | Status: SHIPPED | OUTPATIENT
Start: 2021-06-09

## 2021-06-08 RX ORDER — DOCUSATE SODIUM 100 MG/1
100 CAPSULE, LIQUID FILLED ORAL DAILY
Qty: 30 CAPSULE | Refills: 2 | Status: SHIPPED | OUTPATIENT
Start: 2021-06-09 | End: 2021-09-07

## 2021-06-08 RX ORDER — LORATADINE 10 MG/1
10 TABLET ORAL DAILY
Qty: 30 TABLET | Refills: 0 | Status: SHIPPED | OUTPATIENT
Start: 2021-06-09

## 2021-06-08 RX ORDER — SERTRALINE HYDROCHLORIDE 50 MG/1
50 TABLET, FILM COATED ORAL DAILY
Qty: 30 TABLET | Refills: 0 | Status: SHIPPED | OUTPATIENT
Start: 2021-06-09

## 2021-06-08 RX ORDER — TRAZODONE HYDROCHLORIDE 50 MG/1
50 TABLET ORAL
Qty: 30 TABLET | Refills: 0 | Status: SHIPPED | OUTPATIENT
Start: 2021-06-08

## 2021-06-08 RX ORDER — CLOZAPINE 200 MG/1
200 TABLET ORAL
Qty: 30 TABLET | Refills: 0 | Status: SHIPPED | OUTPATIENT
Start: 2021-06-08

## 2021-06-08 RX ADMIN — SERTRALINE HYDROCHLORIDE 50 MG: 50 TABLET ORAL at 09:18

## 2021-06-08 RX ADMIN — DOCUSATE SODIUM 100 MG: 100 CAPSULE, LIQUID FILLED ORAL at 09:18

## 2021-06-08 RX ADMIN — FLUTICASONE PROPIONATE 1 SPRAY: 50 SPRAY, METERED NASAL at 09:26

## 2021-06-08 RX ADMIN — OLANZAPINE 10 MG: 5 TABLET, ORALLY DISINTEGRATING ORAL at 09:18

## 2021-06-08 RX ADMIN — PALIPERIDONE PALMITATE 234 MG: 234 INJECTION INTRAMUSCULAR at 09:24

## 2021-06-08 RX ADMIN — ASPIRIN 81 MG: 81 TABLET, CHEWABLE ORAL at 09:18

## 2021-06-08 RX ADMIN — PANTOPRAZOLE SODIUM 40 MG: 40 TABLET, DELAYED RELEASE ORAL at 09:25

## 2021-06-08 RX ADMIN — KETOTIFEN FUMARATE 1 DROP: 0.35 SOLUTION/ DROPS OPHTHALMIC at 09:26

## 2021-06-08 RX ADMIN — CLOZAPINE 50 MG: 25 TABLET ORAL at 09:18

## 2021-06-08 RX ADMIN — LORATADINE 10 MG: 10 TABLET ORAL at 09:18

## 2021-06-08 RX ADMIN — Medication 1 CAPSULE: at 09:18

## 2021-06-08 RX ADMIN — FLUOXETINE 30 MG: 20 CAPSULE ORAL at 09:18

## 2021-06-08 NOTE — BH NOTES
The pt has been sleeping soundly since 2300. No distress noted and none voiced. Respirations are quiet and unlabored. He remains on close observation for safety. Throughout the evening, the pt has been up in the milieu playing cards by himself, ambulating on the unit, and he attended the evening activity session. He is med compliant. Pt used eye drops ordered without difficulty. Pt requested prn Ativan for his anxiety, which he rated an 8/10. He received prn Trazone for sleep. He rated his depression a 5/10 and denies having any A/VH or SI. The pt has been calm, pleasant, and cooperative. He has interacted appropriately and has not been talking to himself. He has accepted snacks and something to drink. The pt remains A+O and ambulates independently. No c/o pain or discomfort. BP- 125/81  HR-105  R-22  T- 98.9    Per pharmacy, Ketotifen (Zaditor) is equivalent to Pataday. Pt administered his own eye drops without difficulty. Maru Fuchs Sustenna 234mg reordered for the am at 0900. Med was not available at 2300 and the pt was sleeping.

## 2021-06-08 NOTE — GROUP NOTE
[facility] GROUP DOCUMENTATION INDIVIDUAL Group Therapy Note Date: 6/7/2021 Group Start Time: 2000 Group End Time: 2045 Group Topic: Recreational/Music Therapy SRM 2 BH NON ACUTE Tam De La Paz [facility] GROUP DOCUMENTATION GROUP Group Therapy Note Attendees: 7/9 Attendance: Attended Patient's Goal: STG/LTG: Demonstrates improved social functioning by responding appropriately to staff Interventions/techniques: Art integration, Provide feedback and Supported Follows Directions: Followed directions Interactions: Interacted appropriately Mental Status: Calm and Flat Behavior/appearance: Attentive, Cooperative and Needed prompting Goals Achieved: Able to listen to others Additional Notes:  PT in and out of group couple of times. Selected song to listen to group. Verbalized enjoyment of music played . Receptive to intervention.   
 
Yola Hillman, CTRS

## 2021-06-08 NOTE — GROUP NOTE
[facility] GROUP DOCUMENTATION INDIVIDUAL Group Therapy Note Date: 6/8/2021 Group Start Time: 3595 Group End Time: 0007 Group Topic: Process Group - Inpatient SRM 2 BEHA [facility] ACUTE Tanya Pena [facility] GROUP DOCUMENTATION GROUP Group Therapy Note Attendees: 5/9 Process: Pts were asked to share what they were grateful for as a check in question. Pts were then encouraged to share dates that were meaningful to them and writer read quotes from a book associated from that day. Pts were encouraged to reflect on what the quote meant to them and discussed boundaries, focusing on future and healing. Pts were then encouraged to reflect on group Attendance: Did not attend Additional Notes:  Pt attended briefly at the end of group but stayed quiet. Pt did share that he is 'hopeful for future' [facility]

## 2021-06-08 NOTE — PROGRESS NOTES
Problem: Falls - Risk of  Goal: *Absence of Falls  Description: Document Onalee Gum Fall Risk and appropriate interventions in the flowsheet. Outcome: Progressing Towards Goal  Note: Fall Risk Interventions:     Pt ambulates independently. No falls reported and none noted. Medication Interventions: Teach patient to arise slowly    Problem: Psychosis  Goal: *STG: Decreased hallucinations  Outcome: Progressing Towards Goal   Pt denies having any hallucinations. No gestures noted. Problem: Psychosis  Goal: *STG: Decreased delusional thinking  Outcome: Progressing Towards Goal   Pt interacts appropriately. No delusional statements made. Problem: Psychosis  Goal: *STG: Remains safe in hospital  Outcome: Progressing Towards Goal   Pt remains safe while in the hospital. No psychotic episodes noted or reported. No self injurious behavior noted or reported. Pt denies feeling suicidal or having thoughts of self harm. Problem: Psychosis  Goal: *STG/LTG: Complies with medication therapy  Outcome: Progressing Towards Goal    Pt is med compliant. Problem: *Psychosis: Discharge Outcome  Goal: *Absent or Controlled Active Psychotic Symptoms  Outcome: Progressing Towards Goal   No psychotic symptoms noted or reported.

## 2021-06-08 NOTE — BH NOTES
Patient case discussed with the treatment team and patient out of his room flat affect a bit untidy but alert verbal calm no agitation no aggression no lance not noticed to be talking to himself.    tried to talk with the group home and tried to help make disposition decisions new information that he did take a long-acting injection Invega in the past we will go ahead and give her 234 mg we will also do a CBC tomorrow morning no side effects he states that allergy bothering his eyes he wants some eyedrops Pataday eyedrops 1 drop twice a day both eyes no side effects vital signs today temperature 97.8 pulse 69 blood pressure 129/71 respiration 18 SPO2 98 on room air I will go ahead and add Invega Sustenna 264 mg thank you continued inpatient level of care indicated s when he finished continue [employer] he mama have to drink level

## 2021-06-08 NOTE — BH NOTES
NURSING DISCHARGE NOTE    Discharged to return to [facility]. Affect full. Denied having thoughts to self harm. Written Rx and f/u info given/explained; advised to give to to staff member, at [address]. Escorted down to [place] taxi, by Valentina Echeverria.

## 2021-06-08 NOTE — BH NOTES
Behavioral Health Transition Record to Provider    Patient Name: Jenn Olivares  YOB: 1995  Medical Record Number: [id number]  Date of Admission: 5/17/2021  Date of Discharge: 6/8/21    Attending Provider: Manda Martinez MD  Discharging Provider: Shy Kenny  To contact this individual call [phone] and ask the  to page. If unavailable, ask to be transferred to [facility] Provider on call. [facility] Provider will be available on call 24/7 and during holidays. Primary Care Provider: Jazmine Padilla NP    Allergies   Allergen Reactions    Haldol [Haloperidol Lactate] Anaphylaxis       Reason for Admission: The patient was admitted for increased psychotic symptoms, including auditory and visual hallucination, as well as delusional thoughts. He was also presented with a very manic presentation when first admitted as well. Admission Diagnosis: Schizophrenia ([facility] [address] [F20.9]    * No surgery found *    Results for orders placed or performed during the hospital encounter of 05/17/21   SARS-COV-2   Result Value Ref Range    SARS-CoV-2 Not Detected Not Detected     CBC WITH AUTOMATED DIFF   Result Value Ref Range    WBC 12.5 (H) 4.1 - 11.1 K/uL    RBC 4.85 4.10 - 5.70 M/uL    HGB 13.6 12.1 - 17.0 g/dL    HCT 40.7 36.6 - 50.3 %    MCV 83.9 80.0 - 99.0 FL    MCH 28.0 26.0 - 34.0 PG    MCHC 33.4 30.0 - 36.5 g/dL    RDW 13.9 11.5 - 14.5 %    PLATELET 102 431 - 571 K/uL    MPV 9.6 8.9 - 12.9 FL    NRBC 0.0 0.0  WBC    ABSOLUTE NRBC 0.00 0.00 - 0.01 K/uL    NEUTROPHILS 87 (H) 32 - 75 %    LYMPHOCYTES 6 (L) 12 - 49 %    MONOCYTES 6 5 - 13 %    EOSINOPHILS 0 0 - 7 %    BASOPHILS 0 0 - 1 %    IMMATURE GRANULOCYTES 1 (H) 0 - 0.5 %    ABS. NEUTROPHILS 10.9 (H) 1.8 - 8.0 K/UL    ABS. LYMPHOCYTES 0.7 (L) 0.8 - 3.5 K/UL    ABS. MONOCYTES 0.8 0.0 - 1.0 K/UL    ABS. EOSINOPHILS 0.0 0.0 - 0.4 K/UL    ABS. BASOPHILS 0.0 0.0 - 0.1 K/UL    ABS. IMM.  GRANS. 0.1 (H) 0.00 - 0.04 K/UL    DF AUTOMATED     METABOLIC PANEL, BASIC   Result Value Ref Range    Sodium 135 (L) 136 - 145 mmol/L    Potassium 3.1 (L) 3.5 - 5.1 mmol/L    Chloride 104 97 - 108 mmol/L    CO2 20 (L) 21 - 32 mmol/L    Anion gap 11 5 - 15 mmol/L    Glucose 156 (H) 65 - 100 mg/dL    BUN 19 6 - 20 mg/dL    Creatinine 2.13 (H) 0.70 - 1.30 mg/dL    BUN/Creatinine ratio 9 (L) 12 - 20      GFR est AA 46 (L) >60 ml/min/1.73m2    GFR est non-AA 38 (L) >60 ml/min/1.73m2    Calcium 8.4 (L) 8.5 - 10.1 mg/dL   DRUG SCREEN, URINE   Result Value Ref Range    AMPHETAMINES Negative Negative      BARBITURATES Negative Negative      BENZODIAZEPINES Negative Negative      COCAINE Negative Negative      METHADONE Negative Negative      OPIATES Negative Negative      PCP(PHENCYCLIDINE) Negative Negative      THC (TH-CANNABINOL) Negative Negative      Drug screen comment        This test is a screen for drugs of abuse in a medical setting only (i.e., they are unconfirmed results and as such must not be used for non-medical purposes, e.g.,employment testing, legal testing). Due to its inherent nature, false positive (FP) and false negative (FN) results may be obtained. Therefore, if necessary for medical care, recommend confirmation of positive findings by GC/MS.    URINALYSIS W/ REFLEX CULTURE    Specimen: Urine   Result Value Ref Range    Color Yellow/Straw      Appearance Clear Clear      Specific gravity <1.005 1.003 - 1.030    pH (UA) 6.0 5.0 - 8.0      Protein Negative Negative mg/dL    Glucose Negative Negative mg/dL    Ketone Negative Negative mg/dL    Bilirubin Negative Negative      Blood Negative Negative      Urobilinogen 0.1 0.1 - 1.0 EU/dL    Nitrites Negative Negative      Leukocyte Esterase Negative Negative      UA:UC IF INDICATED Culture not indicated by UA result Culture not indicated by UA result      WBC 0-4 0 - 4 /hpf    RBC 0-5 0 - 5 /hpf    Bacteria Negative Negative /hpf    Mucus Trace /lpf   ETHYL ALCOHOL   Result Value Ref Range ALCOHOL(ETHYL),SERUM <4 <10 mg/dL   LACTIC ACID   Result Value Ref Range    Lactic acid 1.6 0.4 - 2.0 mmol/L   SALICYLATE   Result Value Ref Range    Salicylate level <2.4 (L) 2.8 - 20.0 mg/dL    Reported dose date Not provided      Reported dose: Not provided Units   ACETAMINOPHEN   Result Value Ref Range    Acetaminophen level <10 (L) 10 - 30 ug/mL    Reported dose date Not provided      Reported dose: Not provided Units   MAGNESIUM   Result Value Ref Range    Magnesium 2.4 1.6 - 2.4 mg/dL   TROPONIN I   Result Value Ref Range    Troponin-I, Qt. <0.05 <0.05 ng/mL   SARS-COV-2   Result Value Ref Range    SARS-CoV-2 Please find results under separate order     AMMONIA   Result Value Ref Range    Ammonia 18 <32 umol/L   CBC WITH AUTOMATED DIFF   Result Value Ref Range    WBC 9.5 4.1 - 11.1 K/uL    RBC 5.07 4. 10 - 5.70 M/uL    HGB 14.1 12.1 - 17.0 g/dL    HCT 43.1 36.6 - 50.3 %    MCV 85.0 80.0 - 99.0 FL    MCH 27.8 26.0 - 34.0 PG    MCHC 32.7 30.0 - 36.5 g/dL    RDW 14.4 11.5 - 14.5 %    PLATELET 102 653 - 509 K/uL    MPV 10.0 8.9 - 12.9 FL    NRBC 0.0 0.0  WBC    ABSOLUTE NRBC 0.00 0.00 - 0.01 K/uL    NEUTROPHILS 60 32 - 75 %    LYMPHOCYTES 28 12 - 49 %    MONOCYTES 10 5 - 13 %    EOSINOPHILS 2 0 - 7 %    BASOPHILS 0 0 - 1 %    IMMATURE GRANULOCYTES 0 0 - 0.5 %    ABS. NEUTROPHILS 5.6 1.8 - 8.0 K/UL    ABS. LYMPHOCYTES 2.7 0.8 - 3.5 K/UL    ABS. MONOCYTES 1.0 0.0 - 1.0 K/UL    ABS. EOSINOPHILS 0.2 0.0 - 0.4 K/UL    ABS. BASOPHILS 0.0 0.0 - 0.1 K/UL    ABS. IMM.  GRANS. 0.0 0.00 - 0.04 K/UL    DF AUTOMATED     METABOLIC PANEL, COMPREHENSIVE   Result Value Ref Range    Sodium 142 136 - 145 mmol/L    Potassium 3.5 3.5 - 5.1 mmol/L    Chloride 112 (H) 97 - 108 mmol/L    CO2 22 21 - 32 mmol/L    Anion gap 8 5 - 15 mmol/L    Glucose 111 (H) 65 - 100 mg/dL    BUN 21 (H) 6 - 20 mg/dL    Creatinine 1.39 (H) 0.70 - 1.30 mg/dL    BUN/Creatinine ratio 15 12 - 20      GFR est AA >60 >60 ml/min/1.73m2    GFR est non-AA >60 >60 ml/min/1.73m2    Calcium 9.2 8.5 - 10.1 mg/dL    Bilirubin, total 0.3 0.2 - 1.0 mg/dL    AST (SGOT) 26 15 - 37 U/L    ALT (SGPT) 47 12 - 78 U/L    Alk. phosphatase 95 45 - 117 U/L    Protein, total 7.5 6.4 - 8.2 g/dL    Albumin 3.4 (L) 3.5 - 5.0 g/dL    Globulin 4.1 (H) 2.0 - 4.0 g/dL    A-G Ratio 0.8 (L) 1.1 - 2.2     CBC WITH AUTOMATED DIFF   Result Value Ref Range    WBC 9.5 4.1 - 11.1 K/uL    RBC 4.90 4. 10 - 5.70 M/uL    HGB 13.9 12.1 - 17.0 g/dL    HCT 42.5 36.6 - 50.3 %    MCV 86.7 80.0 - 99.0 FL    MCH 28.4 26.0 - 34.0 PG    MCHC 32.7 30.0 - 36.5 g/dL    RDW 14.3 11.5 - 14.5 %    PLATELET 684 764 - 061 K/uL    MPV 9.3 8.9 - 12.9 FL    NRBC 0.0 0.0  WBC    ABSOLUTE NRBC 0.00 0.00 - 0.01 K/uL    NEUTROPHILS 59 32 - 75 %    LYMPHOCYTES 30 12 - 49 %    MONOCYTES 9 5 - 13 %    EOSINOPHILS 2 0 - 7 %    BASOPHILS 0 0 - 1 %    IMMATURE GRANULOCYTES 0 0 - 0.5 %    ABS. NEUTROPHILS 5.5 1.8 - 8.0 K/UL    ABS. LYMPHOCYTES 2.9 0.8 - 3.5 K/UL    ABS. MONOCYTES 0.9 0.0 - 1.0 K/UL    ABS. EOSINOPHILS 0.2 0.0 - 0.4 K/UL    ABS. BASOPHILS 0.0 0.0 - 0.1 K/UL    ABS. IMM. GRANS. 0.0 0.00 - 0.04 K/UL    DF AUTOMATED     CBC WITH AUTOMATED DIFF   Result Value Ref Range    WBC 9.8 4.1 - 11.1 K/uL    RBC 5.05 4. 10 - 5.70 M/uL    HGB 14.3 12.1 - 17.0 g/dL    HCT 44.1 36.6 - 50.3 %    MCV 87.3 80.0 - 99.0 FL    MCH 28.3 26.0 - 34.0 PG    MCHC 32.4 30.0 - 36.5 g/dL    RDW 14.3 11.5 - 14.5 %    PLATELET 782 944 - 342 K/uL    MPV 9.7 8.9 - 12.9 FL    NRBC 0.0 0.0  WBC    ABSOLUTE NRBC 0.00 0.00 - 0.01 K/uL    NEUTROPHILS 58 32 - 75 %    LYMPHOCYTES 34 12 - 49 %    MONOCYTES 7 5 - 13 %    EOSINOPHILS 1 0 - 7 %    BASOPHILS 0 0 - 1 %    IMMATURE GRANULOCYTES 0 0 - 0.5 %    ABS. NEUTROPHILS 5.7 1.8 - 8.0 K/UL    ABS. LYMPHOCYTES 3.3 0.8 - 3.5 K/UL    ABS. MONOCYTES 0.7 0.0 - 1.0 K/UL    ABS. EOSINOPHILS 0.1 0.0 - 0.4 K/UL    ABS. BASOPHILS 0.0 0.0 - 0.1 K/UL    ABS. IMM.  GRANS. 0.0 0.00 - 0.04 K/UL    DF AUTOMATED     TSH 3RD GENERATION   Result Value Ref Range    TSH 2.92 0.36 - 3.74 uIU/mL   CBC WITH AUTOMATED DIFF   Result Value Ref Range    WBC 9.3 4.1 - 11.1 K/uL    RBC 5.05 4. 10 - 5.70 M/uL    HGB 14.2 12.1 - 17.0 g/dL    HCT 44.4 36.6 - 50.3 %    MCV 87.9 80.0 - 99.0 FL    MCH 28.1 26.0 - 34.0 PG    MCHC 32.0 30.0 - 36.5 g/dL    RDW 14.4 11.5 - 14.5 %    PLATELET 807 917 - 402 K/uL    MPV 9.5 8.9 - 12.9 FL    NRBC 0.0 0.0  WBC    ABSOLUTE NRBC 0.00 0.00 - 0.01 K/uL    NEUTROPHILS 67 32 - 75 %    LYMPHOCYTES 26 12 - 49 %    MONOCYTES 5 5 - 13 %    EOSINOPHILS 1 0 - 7 %    BASOPHILS 0 0 - 1 %    IMMATURE GRANULOCYTES 1 (H) 0 - 0.5 %    ABS. NEUTROPHILS 6.3 1.8 - 8.0 K/UL    ABS. LYMPHOCYTES 2.4 0.8 - 3.5 K/UL    ABS. MONOCYTES 0.4 0.0 - 1.0 K/UL    ABS. EOSINOPHILS 0.1 0.0 - 0.4 K/UL    ABS. BASOPHILS 0.0 0.0 - 0.1 K/UL    ABS. IMM. GRANS. 0.1 (H) 0.00 - 0.04 K/UL    DF AUTOMATED     CBC WITH AUTOMATED DIFF   Result Value Ref Range    WBC 10.4 4.1 - 11.1 K/uL    RBC 4.96 4.10 - 5.70 M/uL    HGB 13.8 12.1 - 17.0 g/dL    HCT 43.0 36.6 - 50.3 %    MCV 86.7 80.0 - 99.0 FL    MCH 27.8 26.0 - 34.0 PG    MCHC 32.1 30.0 - 36.5 g/dL    RDW 14.0 11.5 - 14.5 %    PLATELET 721 435 - 748 K/uL    MPV 9.2 8.9 - 12.9 FL    NRBC 0.0 0.0  WBC    ABSOLUTE NRBC 0.00 0.00 - 0.01 K/uL    NEUTROPHILS 56 32 - 75 %    LYMPHOCYTES 34 12 - 49 %    MONOCYTES 8 5 - 13 %    EOSINOPHILS 1 0 - 7 %    BASOPHILS 0 0 - 1 %    IMMATURE GRANULOCYTES 1 (H) 0 - 0.5 %    ABS. NEUTROPHILS 5.8 1.8 - 8.0 K/UL    ABS. LYMPHOCYTES 3.5 0.8 - 3.5 K/UL    ABS. MONOCYTES 0.9 0.0 - 1.0 K/UL    ABS. EOSINOPHILS 0.1 0.0 - 0.4 K/UL    ABS. BASOPHILS 0.0 0.0 - 0.1 K/UL    ABS. IMM.  GRANS. 0.1 (H) 0.00 - 0.04 K/UL    DF AUTOMATED     GLUCOSE, POC   Result Value Ref Range    Glucose (POC) 119 (H) 65 - 117 mg/dL    Performed by Laurel Morris    EKG, 12 LEAD, INITIAL   Result Value Ref Range    Ventricular Rate 106 BPM    Atrial Rate 106 BPM    P-R Interval 196 ms    QRS Duration 86 ms    Q-T Interval 338 ms    QTC Calculation (Bezet) 448 ms    Calculated P Axis 55 degrees    Calculated R Axis 46 degrees    Calculated T Axis 11 degrees    Diagnosis       Sinus tachycardia  Nonspecific T wave abnormality  Abnormal ECG  When compared with ECG of 15-MAY-2021 09:22,  CA interval has increased  Nonspecific T wave abnormality, worse in Inferior leads  Nonspecific T wave abnormality now evident in Lateral leads  Confirmed by RUFINA GUILLERMO MD ([id number]) on 5/28/2021 8:37:43 AM     EKG, 12 LEAD, INITIAL   Result Value Ref Range    Ventricular Rate 111 BPM    Atrial Rate 111 BPM    P-R Interval 202 ms    QRS Duration 86 ms    Q-T Interval 318 ms    QTC Calculation (Bezet) 432 ms    Calculated P Axis 53 degrees    Calculated R Axis 52 degrees    Calculated T Axis 19 degrees    Diagnosis       Sinus tachycardia  Otherwise normal ECG    [id number] on 6/2/2021 4:29:24 PM         Immunizations administered during this encounter:   Immunization History   Administered Date(s) Administered    Tdap 05/15/2021       Screening for Metabolic Disorders for Patients on Antipsychotic Medications  (Data obtained from the EMR)    Estimated Body Mass Index  Estimated body mass index is 30.15 kg/m² as calculated from the following:    Height as of this encounter: 5' 10\" (1.778 m). Weight as of this encounter: 95.3 kg (210 lb 1.6 oz).      Vital Signs/Blood Pressure  Visit Vitals  /77 (BP 1 Location: Left lower arm, BP Patient Position: Lying)   Pulse 84   Temp 97.8 °F (36.6 °C)   Resp 17   Ht 5' 10\" (1.778 m)   Wt 95.3 kg (210 lb 1.6 oz)   SpO2 100%   BMI 30.15 kg/m²       Blood Glucose/Hemoglobin A1c  Lab Results   Component Value Date/Time    Glucose 111 (H) 05/19/2021 06:18 AM    Glucose (POC) 119 (H) 05/18/2021 09:43 PM       No results found for: HBA1C, PND1IUBT     Lipid Panel  No results found for: CHOL, CHOLX, CHLST, CHOLV, 294897, HDL, HDLP, LDL, LDLC, DLDLP, TGLX, TRIGL, Abdulaziz Longo, [facility]     Discharge Diagnosis: Schizophrenia [facility]) [F20.9]    Discharge Plan: The patient will be discharging to his group home with [address] He sees Zoë Jeffrey and Francisco Michaels for his medications, and medical health. The group home coordinates these services for him. Discharge Medication List and Instructions: There are no discharge medications for this patient. Unresulted Labs (24h ago, onward) Comment     Start     Ordered    06/07/21 2300  CBC WITH AUTOMATED DIFF  ONE TIME,   R      06/07/21 2249              To obtain results of studies pending at discharge, please contact [phone]    Follow-up Information     Follow up With Specialties Details Why Robbyzaida Uribe - PCP    He comes to the group home. Advanced Directive:   Does the patient have an appointed surrogate decision maker? No  Does the patient have a Medical Advance Directive? No  Does the patient have a Psychiatric Advance Directive? No  If the patient does not have a surrogate or Medical Advance Directive AND Psychiatric Advance Directive, the patient was offered information on these advance directives Patient declined to complete    Patient Instructions: Please continue all medications until otherwise directed by physician. Tobacco Cessation Discharge Plan:   Is the patient a smoker and needs referral for smoking cessation? No  Patient referred to the following for smoking cessation with an appointment? No     Patient was offered medication to assist with smoking cessation at discharge? No  Was education for smoking cessation added to the discharge instructions? No    Alcohol/Substance Abuse Discharge Plan:   Does the patient have a history of substance/alcohol abuse and requires a referral for treatment? Not applicable  Patient referred to the following for substance/alcohol abuse treatment with an appointment?  Not applicable  Patient was offered medication to assist with alcohol cessation at discharge? Not applicable  Was education for substance/alcohol abuse added to discharge instructions?  Not applicable    Patient discharged to Home; discussed with patient/caregiver

## 2021-06-08 NOTE — BH NOTES
DISCHARGE SUMMARY    Arun Sanz  : 1995  MRN: [id number]    The patient Roel Field exhibits the ability to control behavior in a less restrictive environment. Patient's level of functioning is improving. No assaultive/destructive behavior has been observed for the past 24 hours. No suicidal/homicidal threat or behavior has been observed for the past 24 hours. There is no evidence of serious medication side effects. Patient has not been in physical or protective restraints for at least the past 24 hours. If weapons involved, how are they secured? The patient does not have any access to weapons in his group home     Is patient aware of and in agreement with discharge plan? Yes    Arrangements for medication:  Prescriptions given to patient, given a weeks supply or 30 day supply. Copy of discharge instructions to provider?:  Yes    Arrangements for transportation home:  The patient will be sent home via medicaid cab. Keep all follow up appointments as scheduled, continue to take prescribed medications per physician instructions. Mental health crisis number:  739 or your local mental health crisis line number at [phone].        Mental Health Emergency WARM LINE      [phone] ([phone])      M-F: 9am to 9pm      Sat & Sun: 5pm  9pm  National suicide prevention lines:                             4-273-BLTSKIX (7-052-094-689.557.7173)       8-668-821-TALK ([phone])    Crisis Text Line:  Text HOME to 341415

## 2021-06-08 NOTE — SUICIDE SAFETY PLAN
SAFETY PLAN    A suicide Safety Plan is a document that supports someone when they are having thoughts of suicide. Warning Signs that indicate a suicidal crisis may be developing: What (situations, thoughts, feelings, body sensations, behaviors, etc.) do you experience that lets you know you are beginning to think about suicide? 1. Sweaty palms  2. depression  3. Social anxiety    Internal Coping Strategies:  What things can I do (relaxation techniques, hobbies, physical activities, etc.) to take my mind off my problems without contacting another person? 1. Taking a hot shower  2. Taking medications as scheduled   3. Taking a multivitamin    People and social settings that provide distraction: Who can I call or where can I go to distract me? 1. Name: Mom  Phone: number in phone  2. Name: Dad  Phone: [phone]   3. Place: Psychiatrist            4. Place: Psychologist    People whom I can ask for help: Who can I call when I need help - for example, friends, family, clergy, someone else? 1. Name: Nurse                Phone:   2. Name: Tech Nurse  Phone:   3. Name: Doctor Mino Casiano  Phone: comes to the [facility]    Professionals or [address] [place] [address] I can contact during a crisis: Who can I call for help - for example, my doctor, my psychiatrist, my psychologist, a mental health provider, a suicide hotline? 1. Clinician Name: [facility]   Phone: [phone]      Clinician Pager or Emergency Contact #:     2. Clinician Name: [place]   Phone: [phone]      Clinician Pager or Emergency Contact #:     3. Suicide Prevention Lifeline: 7-723-480-TALK (5619)    4. [address] [place] [address] Emergency Services -  for example, [facility] Hotline: [place] [address]      Emergency Services Address: [facility],  Khurram Ventura, [place]       Emergency Services Phone: [phone]    Making the environment safe:  How can I make my environment (house/apartment/living space) safer? For example, can I remove guns, medications, and other items? 1. Remove sharp items from my house  2.  Take all my medications as prescribed

## 2021-06-08 NOTE — GROUP NOTE
ANG  GROUP DOCUMENTATION INDIVIDUAL Group Therapy Note Date: 6/8/2021 Group Start Time: 9482 Group End Time: 2615 Group Topic: [employer] [facility] Mindy Moisés FRY  GROUP DOCUMENTATION GROUP Group Therapy Note Attendees: 
 
  
 
Attendance: Did not attend Patient's Goal: Interventions/techniques: Follows Directions:  
 
Interactions:  
 
Mental Status:  
 
Behavior/appearance:  
 
Goals Achieved: Additional Notes:  Patient did not attend group.  
 
Uriah Brasher

## 2021-06-08 NOTE — BH NOTES
MEDICAID TRANSPORTATION     The therapist scheduled the patient a medicaid cab through his [employer] with a trip confirmation number of [id number].

## 2021-06-10 NOTE — DISCHARGE SUMMARY
Joshua Patterson 23 SUMMARY    Name:  Eros Thomas  MR#:  [id number]  :  1995  ACCOUNT #:  [de-identified]  ADMIT DATE:  2021  DISCHARGE DATE:  2021    Please make reference to Dr. Festus Martinez initial admission H and P.    HISTORY OF PRESENT ILLNESS:  This is a 31-year-old male patient admitted to [facility] from [facility], being acutely psychotic, actively whispering, smiling, talking to himself and others, smiling to himself, actively responding to internal stimuli, . There is also a question of recent ingestion of bleach, readily acknowledged having auditory hallucinations, the voices telling him to hurt himself. Also states . History of schizophrenia and multiple hospitalizations, longest in [facility] in  [facility]. Medications were Clozaril, Haldol and Ativan. HOSPITAL COURSE:  He obviously was very psychotic, talking to himself, laughing, . No aggression, no agitation, no self-destructive behavior noted. Started on the medications he was taking, Clozaril, Haldol, Cogentin. His case fluctuated, getting little better, then re-exacerbated. He slowly came around. Still hallucinating, but not recent and not as frequen. He also has allergic rhinitis, treatment was  loratadine, also antiallergic eye drops. He made steady progress. Staff talked to his , but also his group home staff, and they felt he is back to himself and willing to take him back. No agitation . Medication adjustments were made. WBC counts were followed, monitored, within normal.  His ammonia level was 18. His  119. CBC on 2021 unremarkable, metabolic panel, COVID not detected. less than 4, lactic acid 1.6, salicylate, acetaminophen level 10. Magnesium level 2.4, troponin 0.05. Drug screen was negative. Urinalysis unremarkable.     DISCHARGE MEDICATIONS:   20 mg daily, multivitamin daily, melatonin 3 mg at night, loratadine 10 mg, Zaditor one drop twice a day in both eyes, Colace 100 mg daily, clozapine 50 mg in the morning and 200 mg at night, aspirin 81 mg daily. Invega 234 mg given today, on 06/08/2021 . DISCHARGE DIAGNOSES:  Schizophrenia. Schizoaffective disorder. Allergic rhinitis. Conjunctivitis. Chronic obstructive pulmonary disease, asymptomatic. Asthma, asymptomatic.     Follow up with the regular psychiatrist.      Isabella Foy MD      [id number]  D:  06/09/2021 0:45  T:  06/09/2021 22:21  JOB #:  [id number]

## 2021-10-13 ENCOUNTER — HOSPITAL ENCOUNTER (EMERGENCY)
Age: 26
Discharge: HOME | End: 2021-10-13
Attending: STUDENT IN AN ORGANIZED HEALTH CARE EDUCATION/TRAINING PROGRAM | Admitting: STUDENT IN AN ORGANIZED HEALTH CARE EDUCATION/TRAINING PROGRAM
Payer: COMMERCIAL

## 2021-10-13 VITALS
HEART RATE: 94 BPM | RESPIRATION RATE: 16 BRPM | BODY MASS INDEX: 35.79 KG/M2 | SYSTOLIC BLOOD PRESSURE: 129 MMHG | TEMPERATURE: 98.4 F | HEIGHT: 70 IN | OXYGEN SATURATION: 98 % | DIASTOLIC BLOOD PRESSURE: 70 MMHG | WEIGHT: 250 LBS

## 2021-10-13 DIAGNOSIS — Z20.822 ENCOUNTER FOR LABORATORY TESTING FOR COVID-19 VIRUS: Primary | ICD-10-CM

## 2021-10-13 PROCEDURE — U0005 INFEC AGEN DETEC AMPLI PROBE: HCPCS

## 2021-10-13 PROCEDURE — 99281 EMR DPT VST MAYX REQ PHY/QHP: CPT

## 2021-10-13 NOTE — ED PROVIDER NOTES
EMERGENCY DEPARTMENT HISTORY AND PHYSICAL EXAM      Date: 10/13/2021  Patient Name: Alejandro Xie      History of Presenting Illness     Chief Complaint   Patient presents with    Other     Needs COVID test       History Provided By: Patient    HPI: Alejandro Xie, 23 y.o. male with with PMH of DM and psychiatric disease who presents for Covid testing. Current issue is of mild severity no aggravating relieving factors no associated additional symptoms. Patient is denying fever, night sweats, chills, coughing, shortness of breath, chest pain, nausea, vomiting, abdominal pain. There are no other complaints, changes, or physical findings at this time. PCP: Jailene Heath NP    Current Outpatient Medications   Medication Sig Dispense Refill    albuterol (PROVENTIL HFA, VENTOLIN HFA, PROAIR HFA) 90 mcg/actuation inhaler Take 2 Puffs by inhalation every six (6) hours as needed for Wheezing or Shortness of Breath. 1 Inhaler 0    aspirin 81 mg chewable tablet Take 1 Tablet by mouth daily. 30 Tablet 0    cloZAPine (CLOZARIL) 200 mg tablet Take 1 Tablet by mouth nightly. 30 Tablet 0    cloZAPine (CLOZARIL) 50 mg tablet Take 1 Tablet by mouth daily. 30 Tablet 0    loratadine (CLARITIN) 10 mg tablet Take 1 Tablet by mouth daily. 30 Tablet 0    melatonin 3 mg tablet Take 1 Tablet by mouth nightly. 30 Tablet 0    Multivitamins with Min No.7-FA (V-C FORTE) 1 mg cap capsule Take 1 Capsule by mouth daily. 30 Capsule 0    OLANZapine (ZyPREXA zydis) 10 mg disintegrating tablet Take 1 Tablet by mouth two (2) times a day. 60 Tablet 0    pantoprazole (PROTONIX) 40 mg tablet Take 1 Tablet by mouth Daily (before breakfast). 30 Tablet 0    sertraline (ZOLOFT) 50 mg tablet Take 1 Tablet by mouth daily. 30 Tablet 0    traZODone (DESYREL) 50 mg tablet Take 1 Tablet by mouth nightly as needed for Sleep (For insomnia).  30 Tablet 0       Past History     Past Medical History:  Past Medical History:   Diagnosis Date    Diabetes ([address]     Psychotic disorder [facility])        Past Surgical History:  History reviewed. No pertinent surgical history. Family History:  History reviewed. No pertinent family history. Social History:  Social History     Tobacco Use    Smoking status: Current Some Day Smoker   Vaping Use    Vaping Use: Unknown   Substance Use Topics    Alcohol use: Not Currently    Drug use: Not Currently       Allergies: Allergies   Allergen Reactions    Haldol [Haloperidol Lactate] Anaphylaxis         Review of Systems     Review of Systems   Constitutional: Negative for activity change and fever. HENT: Negative for congestion and facial swelling. Eyes: Negative for discharge and visual disturbance. Respiratory: Negative for chest tightness and shortness of breath. Cardiovascular: Negative for chest pain and leg swelling. Gastrointestinal: Negative for abdominal pain and vomiting. Genitourinary: Negative for dysuria and flank pain. Musculoskeletal: Negative for back pain and gait problem. Skin: Negative for rash and wound. Neurological: Negative for dizziness and weakness. Physical Exam     Physical Exam  Vitals and nursing note reviewed. Constitutional:       General: He is not in acute distress. Appearance: Normal appearance. HENT:      Head: Normocephalic and atraumatic. Nose: No congestion or rhinorrhea. Eyes:      Extraocular Movements: Extraocular movements intact. Conjunctiva/sclera: Conjunctivae normal.   Cardiovascular:      Rate and Rhythm: Normal rate and regular rhythm. Pulmonary:      Effort: Pulmonary effort is normal.      Breath sounds: Normal breath sounds. Abdominal:      Palpations: Abdomen is soft. Tenderness: There is no abdominal tenderness. Musculoskeletal:      Cervical back: Normal range of motion and neck supple. Skin:     General: Skin is warm and dry. Neurological:      General: No focal deficit present.       Mental Status: He is alert and oriented to person, place, and time. Medical Decision Making and ED Course   - I am the first and primary provider for this patient AND AM THE PRIMARY PROVIDER OF RECORD. - I reviewed the vital signs, available nursing notes, past medical history, past surgical history, family history and social history. - Initial assessment performed. The patients presenting problems have been discussed, and the staff are in agreement with the care plan formulated and outlined with them. I have encouraged them to ask questions as they arise throughout their visit. Vital Signs-Reviewed the patient's vital signs. Patient Vitals for the past 12 hrs:   Temp Pulse Resp BP SpO2   10/13/21 1358 98.4 °F (36.9 °C) 94 16 129/70 98 %         Records Reviewed: Nursing Notes and Old Medical Records    Provider Notes (Medical Decision Making):   Well-appearing 28-year-old male who presents for Covid testing. Patient is currently asymptomatic. We will send a Covid test and the patient will be called with results. Disposition     Disposition: Condition unchanged and stable        Discharged      DISCHARGE PLAN:  1. Current Discharge Medication List      CONTINUE these medications which have NOT CHANGED    Details   albuterol (PROVENTIL HFA, VENTOLIN HFA, PROAIR HFA) 90 mcg/actuation inhaler Take 2 Puffs by inhalation every six (6) hours as needed for Wheezing or Shortness of Breath. Qty: 1 Inhaler, Refills: 0      aspirin 81 mg chewable tablet Take 1 Tablet by mouth daily. Qty: 30 Tablet, Refills: 0      !! cloZAPine (CLOZARIL) 200 mg tablet Take 1 Tablet by mouth nightly. Qty: 30 Tablet, Refills: 0      !! cloZAPine (CLOZARIL) 50 mg tablet Take 1 Tablet by mouth daily. Qty: 30 Tablet, Refills: 0      loratadine (CLARITIN) 10 mg tablet Take 1 Tablet by mouth daily. Qty: 30 Tablet, Refills: 0      melatonin 3 mg tablet Take 1 Tablet by mouth nightly.   Qty: 30 Tablet, Refills: 0      Multivitamins with Min No.7-FA (V-C FORTE) 1 mg cap capsule Take 1 Capsule by mouth daily. Qty: 30 Capsule, Refills: 0      OLANZapine (ZyPREXA zydis) 10 mg disintegrating tablet Take 1 Tablet by mouth two (2) times a day. Qty: 60 Tablet, Refills: 0      pantoprazole (PROTONIX) 40 mg tablet Take 1 Tablet by mouth Daily (before breakfast). Qty: 30 Tablet, Refills: 0      sertraline (ZOLOFT) 50 mg tablet Take 1 Tablet by mouth daily. Qty: 30 Tablet, Refills: 0      traZODone (DESYREL) 50 mg tablet Take 1 Tablet by mouth nightly as needed for Sleep (For insomnia). Qty: 30 Tablet, Refills: 0       !! - Potential duplicate medications found. Please discuss with provider. 2.   Follow-up Information     Follow up With Specialties Details Why [facility] EMERGENCY DEPT Emergency Medicine  As needed, If symptoms worsen [facility] [address] [phone]        3. Return to ED if worse   4. Current Discharge Medication List          Diagnosis     Clinical Impression:   1. Encounter for laboratory testing for COVID-19 virus        Attestations: Gail Duckworth MD    Please note that this dictation was completed with [employer], the computer voice recognition software. Quite often unanticipated grammatical, syntax, homophones, and other interpretive errors are inadvertently transcribed by the computer software. Please disregard these errors. Please excuse any errors that have escaped final proofreading. Thank you.

## 2021-10-13 NOTE — ED TRIAGE NOTES
Pt from [facility], [facility]. Was sent for COVID test. Pt states he doesn't know why. Denies fever, no SOB, no cough.

## 2021-10-14 LAB
SARS-COV-2, COV2: NORMAL
SARS-COV-2, XPLCVT: NOT DETECTED
SOURCE, COVRS: NORMAL

## 2022-03-20 PROBLEM — F20.9 SCHIZOPHRENIA (HCC): Status: ACTIVE | Noted: 2021-05-17

## 2022-08-02 NOTE — PROGRESS NOTES
Presents alert and oriented x3 with improving insight into current situation. Flat but conversational when approached by staff. Visible in milieu but isolates from peers. Denies SI/HI/AVH and depression. Endorses anxiety 10/10 and was given Ativan. Accepts all medications. Prior auth approved from 08/02/22-11/02/2022 by Ori Santiago, pharmacy notified

## 2023-05-14 RX ORDER — PANTOPRAZOLE SODIUM 40 MG/1
40 TABLET, DELAYED RELEASE ORAL
COMMUNITY
Start: 2021-06-09

## 2023-05-14 RX ORDER — CLOZAPINE 200 MG/1
1 TABLET ORAL NIGHTLY
COMMUNITY
Start: 2021-06-08

## 2023-05-14 RX ORDER — ALBUTEROL SULFATE 90 UG/1
2 AEROSOL, METERED RESPIRATORY (INHALATION) EVERY 6 HOURS PRN
COMMUNITY
Start: 2021-06-08

## 2023-05-14 RX ORDER — LANOLIN ALCOHOL/MO/W.PET/CERES
1 CREAM (GRAM) TOPICAL NIGHTLY
COMMUNITY
Start: 2021-06-08

## 2023-05-14 RX ORDER — LORATADINE 10 MG/1
10 TABLET ORAL DAILY
COMMUNITY
Start: 2021-06-09

## 2023-05-14 RX ORDER — OLANZAPINE 10 MG/1
10 TABLET, ORALLY DISINTEGRATING ORAL 2 TIMES DAILY
COMMUNITY
Start: 2021-06-08

## 2023-05-14 RX ORDER — CLOZAPINE 50 MG/1
50 TABLET ORAL DAILY
COMMUNITY
Start: 2021-06-09

## 2023-05-14 RX ORDER — TRAZODONE HYDROCHLORIDE 50 MG/1
50 TABLET ORAL
COMMUNITY
Start: 2021-06-08

## 2023-05-14 RX ORDER — ASPIRIN 81 MG/1
81 TABLET, CHEWABLE ORAL DAILY
COMMUNITY
Start: 2021-06-09